# Patient Record
Sex: FEMALE | Race: WHITE | NOT HISPANIC OR LATINO | Employment: FULL TIME | URBAN - METROPOLITAN AREA
[De-identification: names, ages, dates, MRNs, and addresses within clinical notes are randomized per-mention and may not be internally consistent; named-entity substitution may affect disease eponyms.]

---

## 2017-01-07 ENCOUNTER — HOSPITAL ENCOUNTER (OUTPATIENT)
Dept: LAB | Facility: MEDICAL CENTER | Age: 32
End: 2017-01-07
Attending: INTERNAL MEDICINE
Payer: COMMERCIAL

## 2017-01-07 DIAGNOSIS — E03.1 CONGENITAL HYPOTHYROIDISM WITHOUT GOITER: ICD-10-CM

## 2017-01-07 LAB
T4 FREE SERPL-MCNC: 0.5 NG/DL (ref 0.53–1.43)
TSH SERPL DL<=0.005 MIU/L-ACNC: 0.96 UIU/ML (ref 0.3–3.7)

## 2017-01-07 PROCEDURE — 84439 ASSAY OF FREE THYROXINE: CPT

## 2017-01-07 PROCEDURE — 36415 COLL VENOUS BLD VENIPUNCTURE: CPT

## 2017-01-07 PROCEDURE — 84443 ASSAY THYROID STIM HORMONE: CPT

## 2017-01-18 ENCOUNTER — OFFICE VISIT (OUTPATIENT)
Dept: MEDICAL GROUP | Facility: MEDICAL CENTER | Age: 32
End: 2017-01-18
Payer: COMMERCIAL

## 2017-01-18 VITALS
SYSTOLIC BLOOD PRESSURE: 102 MMHG | BODY MASS INDEX: 26.35 KG/M2 | RESPIRATION RATE: 16 BRPM | HEIGHT: 60 IN | DIASTOLIC BLOOD PRESSURE: 62 MMHG | HEART RATE: 102 BPM | WEIGHT: 134.2 LBS | OXYGEN SATURATION: 100 % | TEMPERATURE: 99.2 F

## 2017-01-18 DIAGNOSIS — Z00.00 HEALTH CARE MAINTENANCE: ICD-10-CM

## 2017-01-18 DIAGNOSIS — F41.0 PANIC ATTACKS: ICD-10-CM

## 2017-01-18 DIAGNOSIS — F41.1 GAD (GENERALIZED ANXIETY DISORDER): ICD-10-CM

## 2017-01-18 DIAGNOSIS — F33.1 MAJOR DEPRESSIVE DISORDER, RECURRENT, MODERATE (HCC): ICD-10-CM

## 2017-01-18 DIAGNOSIS — E03.1 CONGENITAL HYPOTHYROIDISM WITHOUT GOITER: ICD-10-CM

## 2017-01-18 PROCEDURE — 99214 OFFICE O/P EST MOD 30 MIN: CPT | Performed by: INTERNAL MEDICINE

## 2017-01-18 RX ORDER — SERTRALINE HYDROCHLORIDE 100 MG/1
50 TABLET, FILM COATED ORAL DAILY
Qty: 90 TAB | Refills: 0 | Status: SHIPPED | OUTPATIENT
Start: 2017-01-18 | End: 2017-02-21 | Stop reason: SDUPTHER

## 2017-01-18 RX ORDER — LEVOTHYROXINE SODIUM 0.12 MG/1
TABLET ORAL
Qty: 180 TAB | Refills: 1 | Status: SHIPPED | OUTPATIENT
Start: 2017-01-18 | End: 2017-07-24 | Stop reason: SDUPTHER

## 2017-01-18 ASSESSMENT — PATIENT HEALTH QUESTIONNAIRE - PHQ9
3. TROUBLE FALLING OR STAYING ASLEEP OR SLEEPING TOO MUCH: 2
6. FEELING BAD ABOUT YOURSELF - OR THAT YOU ARE A FAILURE OR HAVE LET YOURSELF OR YOUR FAMILY DOWN: 2
7. TROUBLE CONCENTRATING ON THINGS, SUCH AS READING THE NEWSPAPER OR WATCHING TELEVISION: 3
8. MOVING OR SPEAKING SO SLOWLY THAT OTHER PEOPLE COULD HAVE NOTICED. OR THE OPPOSITE, BEING SO FIGETY OR RESTLESS THAT YOU HAVE BEEN MOVING AROUND A LOT MORE THAN USUAL: 2
SUM OF ALL RESPONSES TO PHQ9 QUESTIONS 1 AND 2: 2
2. FEELING DOWN, DEPRESSED, IRRITABLE, OR HOPELESS: 1
4. FEELING TIRED OR HAVING LITTLE ENERGY: 3
1. LITTLE INTEREST OR PLEASURE IN DOING THINGS: 1
CLINICAL INTERPRETATION OF PHQ2 SCORE: 2
5. POOR APPETITE OR OVEREATING: 3
9. THOUGHTS THAT YOU WOULD BE BETTER OFF DEAD, OR OF HURTING YOURSELF: 0
SUM OF ALL RESPONSES TO PHQ QUESTIONS 1-9: 17

## 2017-01-18 NOTE — MR AVS SNAPSHOT
Isabel Curry   2017 12:40 PM   Office Visit   MRN: [id number]    Department:  [place] [address]   Dept Phone:  [phone]    Description:  Female : 1985   Provider:  Deny Awad M.D.           Reason for Visit     Medication Refill synthroid     Medication Management new rx follow up       Allergies as of 2017     No Known Allergies      You were diagnosed with     VALENTE (generalized anxiety disorder)   [144920]       Major depressive disorder, recurrent, moderate (CMS-HCC)   [942058]       Panic attacks   [932856]         Vital Signs     Blood Pressure Pulse Temperature Respirations Height Weight    102/62 mmHg 102 37.3 °C (99.2 °F) 16 1.524 m (5') 60.873 kg (134 lb 3.2 oz)    Body Mass Index Oxygen Saturation Last Menstrual Period Smoking Status          26.21 kg/m2 100% 2017 Light Tobacco Smoker        Basic Information     Date Of Birth Sex Race Ethnicity Preferred Language    1985 Female White Non- English      Your appointments     2017 10:00 AM   Initial Behavioral Health Eval with Layla Pedersen   [facility]    [address]  [address]  [facility] [id number] [phone]           30 MIN ARRIVAL PRIOR TO SCHEDULED APPOINTMENT IS REQUIRED. Your appointment will be rescheduled if you arrive later than 30 min before the appointed time.              Problem List              ICD-10-CM Priority Class Noted - Resolved    Congenital hypothyroidism without goiter E03.1   2015 - Present    Health care maintenance Z00.00   2015 - Present    VALENTE (generalized anxiety disorder) F41.1   2016 - Present    Panic attacks F41.0   2016 - Present    Major depressive disorder, recurrent, moderate (CMS-HCC) F33.1   2017 - Present      Health Maintenance        Date Due Completion Dates    IMM DTaP/Tdap/Td Vaccine (1 - Tdap) 2006 ---    PAP SMEAR 2008 ---    IMM INFLUENZA (1) 2016 ---               Current Immunizations     No immunizations on file.      Below and/or attached are the medications your provider expects you to take. Review all of your home medications and newly ordered medications with your provider and/or pharmacist. Follow medication instructions as directed by your provider and/or pharmacist. Please keep your medication list with you and share with your provider. Update the information when medications are discontinued, doses are changed, or new medications (including over-the-counter products) are added; and carry medication information at all times in the event of emergency situations     Allergies:  No Known Allergies          Medications  Valid as of: January 18, 2017 - 12:53 PM    Generic Name Brand Name Tablet Size Instructions for use    ALPRAZolam (Tab) XANAX 0.25 MG Take 1 Tab by mouth at bedtime as needed for Sleep.        Cyanocobalamin   Take  by mouth every day.        Fish Oil   by Does not apply route every day.        Levothyroxine Sodium (Tab) SYNTHROID 125 MCG TAKE 1 TABLET BY MOUTH TWICE A DAY        Sertraline HCl (Tab) ZOLOFT 100 MG Take 0.5 Tabs by mouth every day.        .                 Medicines prescribed today were sent to:     [id number] - [place], [place] - [address] [address]    Phone: [phone] Fax: [phone]    Open 24 Hours?: No    [facility] [address] [facility], [place] - [address] [address]    Phone: [phone] Fax: [phone]    Open 24 Hours?: No      Medication refill instructions:       If your prescription bottle indicates you have medication refills left, it is not necessary to call your provider’s office. Please contact your pharmacy and they will refill your medication.    If your prescription bottle indicates you do not have any refills left, you may request refills at any time through one of the following ways: The online Trusted Insight system (except  Urgent Care), by calling your provider’s office, or by asking your pharmacy to contact your provider’s office with a refill request. Medication refills are processed only during regular business hours and may not be available until the next business day. Your provider may request additional information or to have a follow-up visit with you prior to refilling your medication.   *Please Note: Medication refills are assigned a new Rx number when refilled electronically. Your pharmacy may indicate that no refills were authorized even though a new prescription for the same medication is available at the pharmacy. Please request the medicine by name with the pharmacy before contacting your provider for a refill.           [employer] Access Code: [email or web address]  Expires: 1/27/2017  9:03 AM    [employer]  A secure, online tool to manage your health information     Search to Phone’s [employer]® is a secure, online tool that connects you to your personalized health information from the privacy of your home -- day or night - making it very easy for you to manage your healthcare. Once the activation process is completed, you can even access your medical information using the [employer] amaury, which is available for free in the Apple Amaury store or Google Play store.     [employer] provides the following levels of access (as shown below):   My Chart Features   [facility] Doctor [facility]  Urgent  Care Non-[facility]  Primary Care  Doctor   Email your healthcare team securely and privately 24/7 X X X    Manage appointments: schedule your next appointment; view details of past/upcoming appointments X      Request prescription refills. X      View recent personal medical records, including lab and immunizations X X X X   View health record, including health history, allergies, medications X X X X   Read reports about your outpatient visits, procedures, consult and ER notes X X X X   See your discharge summary, which is a recap  of your hospital and/or ER visit that includes your diagnosis, lab results, and care plan. X X       How to register for Lin[employer]:  1. Go to  [email or web address].  2. Click on the Sign Up Now box, which takes you to the New Member Sign Up page. You will need to provide the following information:  a. Enter your Linq3 Access Code exactly as it appears at the top of this page. (You will not need to use this code after you’ve completed the sign-up process. If you do not sign up before the expiration date, you must request a new code.)   b. Enter your date of birth.   c. Enter your home email address.   d. Click Submit, and follow the next screen’s instructions.  3. Create a Linq3 ID. This will be your Linq3 login ID and cannot be changed, so think of one that is secure and easy to remember.  4. Create a [employer] 2000t password. You can change your password at any time.  5. Enter your Password Reset Question and Answer. This can be used at a later time if you forget your password.   6. Enter your e-mail address. This allows you to receive e-mail notifications when new information is available in Linq3.  7. Click Sign Up. You can now view your health information.    For assistance activating your Linq3 account, call [phone]

## 2017-01-18 NOTE — Clinical Note
[facility]  Deny Awad M.D.  [address]  [facility] [address]  Fax: [phone] Authorization for Release/Disclosure of Protected Health Information   Name: MIKHAIL NICKERSON : 1985 SSN: [id number]   Address: [address] [place] [address] Phone:    [phone] (home)    I authorize the entity listed below to release/disclose the PHI below to [facility]/Deny Awad M.D.   Provider or Entity Name:                                     Address   City, State, [facility]   Phone:      Fax:     Reason for request: continuity of care   Information to be released:    [  ] LAST COLONOSCOPY, including any PATH REPORT [  ] LAST DEXA  [  ] LAST MAMMOGRAM  [XXX  ] LAST PAP [  ] RETINA EXAM REPORT  [  ] IMMUNIZATION RECORDS  [  ] Release all info      [  ] Check here and initial the line next to each item to release ALL health information INCLUDING  _____ Care and treatment for drug and / or alcohol abuse  _____ HIV testing, infection status, or AIDS  _____ Genetic Testing    DATES OF SERVICE OR TIME PERIOD TO BE DISCLOSED: _____________  I understand and acknowledge that:  * This Authorization may be revoked at any time by you in writing, except if your health information has already been used or disclosed.  * Your health information that will be used or disclosed as a result of you signing this authorization could be re-disclosed by the recipient. If this occurs, your re-disclosed health information may no longer be protected by State or Federal laws.  * You may refuse to sign this Authorization. Your refusal will not affect your ability to obtain treatment.  * This Authorization becomes effective upon signing and will  on (date) __________. If no date is indicated, this Authorization will  one (1) year from the signature date.    Name: Mikhail Nickerson    Signature:     Date: 2017

## 2017-01-18 NOTE — PROGRESS NOTES
CC: Isabel Curry is a 31 y.o. female who presents for follow up of the following     Anxiety / Depression, Panic attacaks  Interval course: some improvement  Onset:   Since adolescense  Since then symptoms were up/down, currently severe              Trigger: marriage problems, improved  Mood currently does affect her daily activities.    Previous medications:  none  Current treatment: sertraline, 50 mg QD, counseling   - xanax prn    Risk factors:   · Depression, anxiety, panic attacks  · H/o phobia: no  · H/o panic attacks: no  · H/o hypomanic or manic episode: no  · Substance abuse  (alcohol,  prescription drugs caffeine, tobacco): no  · Family support: yes  · Living alone:  no  · Family history of psych disorders: yes, mother and sister  · Stress: marital issues, improving  · PMH of abuse (sexual, physical, emotional abuse; neglect): no    Depression Screen (PHQ-2/PHQ-9) 12/8/2016 1/18/2017 1/18/2017   PHQ-9 Total Score 20 - 17   PHQ-2 Total Score - 2 -     Congenital hypothyroidism  DG/Onset: congenital  Levothyroxine, 250 mcg, taking daily early in am, before any po intake.  No temperature intolerance. No change in weight,  hair/skin quality, BMs.    No tremors, weakness.  No peripheral swelling.  No mood changes.  Reviewed TSH, as below  FH: multiple    Current Outpatient Prescriptions   Medication Sig Dispense Refill   • sertraline (ZOLOFT) 100 MG Tab Take 0.5 Tabs by mouth every day. 90 Tab 0   • levothyroxine (SYNTHROID) 125 MCG Tab TAKE 1 TABLET BY MOUTH TWICE A DAY 30 Tab 0   • alprazolam (XANAX) 0.25 MG Tab Take 1 Tab by mouth at bedtime as needed for Sleep. 30 Tab 0   • Cyanocobalamin (VITAMIN B-12 PO) Take  by mouth every day.     • FISH OIL by Does not apply route every day.       No current facility-administered medications for this visit.     She  has a past medical history of Hypothyroidism; ADD (attention deficit disorder); VALENTE (generalized anxiety disorder); Depression; and Major  depression.  She  has past surgical history that includes gastric bypass laparoscopic (2012).  Social History   Substance Use Topics   • Smoking status: Light Tobacco Smoker   • Smokeless tobacco: Never Used      Comment: 1 cig per day   • Alcohol Use: No     Social History     Social History Narrative    Lives with  and 2 sons.     Work: stay at home mom     Family History   Problem Relation Age of Onset   • Hypertension Mother    • Hyperlipidemia Mother    • Cancer Mother      breast   • Cancer Maternal Grandmother      breast   • Cancer Maternal Grandfather      leukemia   • Diabetes Father    • Diabetes Paternal Aunt    • Heart Disease Neg Hx    • Stroke Neg Hx    • Thyroid Paternal Grandmother    • Psychiatry Mother      Depression   • Psychiatry Sister      Panic attacks     Family Status   Relation Status Death Age   • Mother Alive    • Father Alive      ROS   Taking supplements to help mood or symptoms: yes  Using alcohol or other substances to help mood or symptoms: no  No polydipsia, polyuria.  No temperature intolerance.  No bowel changes  Denies symptoms of saba: grandiosity, euphoria, need for little or no sleep, rapid pressured speech, spending sprees, reckless or risky behavior, hypersexual behavior.   No visual or auditory hallucinations.  Endo: as above.       Labs     None.     PHYSICAL EXAM   Blood pressure 102/62, pulse 102, temperature 37.3 °C (99.2 °F), resp. rate 16, height 1.524 m (5'), weight 60.873 kg (134 lb 3.2 oz), last menstrual period 01/04/2017, SpO2 100 %.  General: normal speech pattern and content   Clothing and grooming normal.  Behavior: no psychomotor abnormalities or impulsivity  Eye contact: good  Affect: normal  Though content: normal insight and reasoning, no evidence of psychotic process.   Neck/Thyroid: No adenopathy, no palpable thyroid nodules.  Lungs: CTAB  Heart: RRR no ectopy  Neuro: Gait normal. Reflexes normal and symmetric. Sensation grossly intact      Abnormal findings: none    ASSESMENT AND PLAN     1. VALENTE (generalized anxiety disorder)  - sertraline (ZOLOFT) 100 MG Tab; Take 0.5 Tabs by mouth every day.  Dispense: 90 Tab; Refill: 0  2. Major depressive disorder, recurrent, moderate (CMS-HCC)  - sertraline (ZOLOFT) 100 MG Tab; Take 0.5 Tabs by mouth every day.  Dispense: 90 Tab; Refill: 0    Improved, insufficiently, change:   - increase sertraline from 50 mg to 100 mg daily    3. Panic attacks  Improved, treatment as above.    5. Health care maintenance  Pending old records.    - Reviewed effects and side effects of medication, the patient verbalized understanding     25 minutes face to face with 15 spent counseling and coordinating care.     Smoking Counseling: Advised to quit smoking    Follow up: in 6 weeks     Please note that this dictation was created using voice recognition software. Despite all efforts, some minor grammar mistakes are possible.

## 2017-02-21 ENCOUNTER — OFFICE VISIT (OUTPATIENT)
Dept: MEDICAL GROUP | Facility: MEDICAL CENTER | Age: 32
End: 2017-02-21
Payer: COMMERCIAL

## 2017-02-21 VITALS
WEIGHT: 142 LBS | BODY MASS INDEX: 27.88 KG/M2 | HEART RATE: 96 BPM | SYSTOLIC BLOOD PRESSURE: 116 MMHG | RESPIRATION RATE: 14 BRPM | HEIGHT: 60 IN | TEMPERATURE: 97.6 F | OXYGEN SATURATION: 100 % | DIASTOLIC BLOOD PRESSURE: 58 MMHG

## 2017-02-21 DIAGNOSIS — F33.1 MAJOR DEPRESSIVE DISORDER, RECURRENT, MODERATE (HCC): ICD-10-CM

## 2017-02-21 DIAGNOSIS — F41.0 PANIC ATTACKS: ICD-10-CM

## 2017-02-21 DIAGNOSIS — F41.1 GAD (GENERALIZED ANXIETY DISORDER): ICD-10-CM

## 2017-02-21 DIAGNOSIS — Z00.00 HEALTH CARE MAINTENANCE: ICD-10-CM

## 2017-02-21 PROCEDURE — 99214 OFFICE O/P EST MOD 30 MIN: CPT | Performed by: INTERNAL MEDICINE

## 2017-02-21 RX ORDER — SERTRALINE HYDROCHLORIDE 100 MG/1
100 TABLET, FILM COATED ORAL DAILY
Qty: 90 TAB | Refills: 0 | Status: SHIPPED | OUTPATIENT
Start: 2017-02-21 | End: 2018-05-08

## 2017-02-21 RX ORDER — PROPRANOLOL HYDROCHLORIDE 20 MG/1
TABLET ORAL
Qty: 60 TAB | Refills: 1 | Status: SHIPPED | OUTPATIENT
Start: 2017-02-21 | End: 2017-02-22

## 2017-02-21 ASSESSMENT — PATIENT HEALTH QUESTIONNAIRE - PHQ9
5. POOR APPETITE OR OVEREATING: 1
6. FEELING BAD ABOUT YOURSELF - OR THAT YOU ARE A FAILURE OR HAVE LET YOURSELF OR YOUR FAMILY DOWN: 2
4. FEELING TIRED OR HAVING LITTLE ENERGY: 2
1. LITTLE INTEREST OR PLEASURE IN DOING THINGS: 1
9. THOUGHTS THAT YOU WOULD BE BETTER OFF DEAD, OR OF HURTING YOURSELF: 0
7. TROUBLE CONCENTRATING ON THINGS, SUCH AS READING THE NEWSPAPER OR WATCHING TELEVISION: 2
3. TROUBLE FALLING OR STAYING ASLEEP OR SLEEPING TOO MUCH: 3
SUM OF ALL RESPONSES TO PHQ QUESTIONS 1-9: 12
SUM OF ALL RESPONSES TO PHQ9 QUESTIONS 1 AND 2: 2
8. MOVING OR SPEAKING SO SLOWLY THAT OTHER PEOPLE COULD HAVE NOTICED. OR THE OPPOSITE, BEING SO FIGETY OR RESTLESS THAT YOU HAVE BEEN MOVING AROUND A LOT MORE THAN USUAL: 0
2. FEELING DOWN, DEPRESSED, IRRITABLE, OR HOPELESS: 1

## 2017-02-21 NOTE — PROGRESS NOTES
CHIEF COMPLAINT  Chief Complaint   Patient presents with   • Follow-Up   Anxiety    HPI  Patient is a 31 y.o. female patient who presents today for the following       Anxiety / Depression, Panic attacaks  Interval course: some improvement  Onset:   Since adolescense  Since then symptoms were up/down, currently severe              Trigger: marriage problems, improved  Mood currently does affect her daily activities.    Previous medications:  none  Current treatment: sertraline, 50 mg QD, counseling              - xanax prn    Risk factors:   · Depression, anxiety, panic attacks  · H/o phobia: no  · H/o panic attacks: no  · H/o hypomanic or manic episode: no  · Substance abuse  (alcohol,  prescription drugs caffeine, tobacco): no  · Family support: yes  · Living alone:  no  · Family history of psych disorders: yes, mother and sister  · Stress: marital issues, improving  · PMH of abuse (sexual, physical, emotional abuse; neglect): no    VALENTE-7 score:  2/17   1. Feeling nervous, anxious, or on edge  2   2. Not being able to stop or control worrying 2   3. Worrying too much about different things 2   4. Trouble relaxing 2   5. Being so restless that it is hard to sit still 1   6. Becoming easily annoyed or irritable 3   7. Feeling afraid as if something awful might happen 1   Total score 19     Depression Screen (PHQ-2/PHQ-9) 1/18/2017 1/18/2017 2/21/2017   PHQ-9 Total Score - 17 12   PHQ-2 Total Score - - -   PHQ-2 Total Score 2 - -   PHQ-9 Total Score - - -     Reviewed PMH, PSH, FH, SH, ALL, HCM/IMM, no changes  Reviewed MEDS, no changes    Patient Active Problem List    Diagnosis Date Noted   • Major depressive disorder, recurrent, moderate (CMS-HCC) 01/18/2017   • VALENTE (generalized anxiety disorder) 12/08/2016   • Panic attacks 12/08/2016   • Congenital hypothyroidism without goiter 11/04/2015   • Health care maintenance 11/04/2015     CURRENT MEDICATIONS  Current Outpatient Prescriptions   Medication Sig Dispense  Refill   • sertraline (ZOLOFT) 100 MG Tab Take 0.5 Tabs by mouth every day. 90 Tab 0   • levothyroxine (SYNTHROID) 125 MCG Tab TAKE 1 TABLET BY MOUTH TWICE A  Tab 1   • alprazolam (XANAX) 0.25 MG Tab Take 1 Tab by mouth at bedtime as needed for Sleep. 30 Tab 0   • Cyanocobalamin (VITAMIN B-12 PO) Take  by mouth every day.     • FISH OIL by Does not apply route every day.       No current facility-administered medications for this visit.     ALLERGIES  Allergies: Review of patient's allergies indicates no known allergies.  PAST MEDICAL HISTORY  Past Medical History   Diagnosis Date   • Hypothyroidism      congenital   • ADD (attention deficit disorder)    • VALENTE (generalized anxiety disorder)    • Depression    • Major depression      SURGICAL HISTORY  She  has past surgical history that includes gastric bypass laparoscopic (2012).  SOCIAL HISTORY  Social History   Substance Use Topics   • Smoking status: Light Tobacco Smoker   • Smokeless tobacco: Never Used      Comment: 1 cig per day   • Alcohol Use: No     Social History     Social History Narrative    Lives with  and 2 sons.     Work: stay at home mom     FAMILY HISTORY  Family History   Problem Relation Age of Onset   • Hypertension Mother    • Hyperlipidemia Mother    • Cancer Mother      breast   • Cancer Maternal Grandmother      breast   • Cancer Maternal Grandfather      leukemia   • Diabetes Father    • Diabetes Paternal Aunt    • Heart Disease Neg Hx    • Stroke Neg Hx    • Thyroid Paternal Grandmother    • Psychiatry Mother      Depression   • Psychiatry Sister      Panic attacks     Family Status   Relation Status Death Age   • Mother Alive    • Father Alive        ROS   Constitutional: Negative for fever, chills.  HENT: Negative for congestion, sore throat.  Eyes: Negative for blurred vision.   Respiratory: Negative for cough, shortness of breath.  Cardiovascular: Negative for chest pain, palpitations.   Gastrointestinal: Negative for  heartburn, nausea, abdominal pain.   Genitourinary: Negative for dysuria.  Musculoskeletal: Negative for significant myalgias, back pain and joint pain.   Skin: Negative for rash and itching.   Neuro: Negative for dizziness, weakness and headaches.   Endo/Heme/Allergies: Does not bruise/bleed easily; controlled hypothyroid. .   Psychiatric/Behavioral: as above.    PHYSICAL EXAM   /58 mmHg  Pulse 96  Temp(Src) 36.4 °C (97.6 °F)  Resp 14  Ht 1.524 m (5')  Wt 64.411 kg (142 lb)  BMI 27.73 kg/m2  SpO2 100%  General:  NAD, well appearing  HEENT:   NC/AT, PERRLA, EOMI, TMs are clear. Oropharyngeal mucosa is pink,  without lesions;  no cervical / supraclavicular  lymphadenopathy, no thyromegaly.    Cardiovascular: RRR.   No m/r/g. No carotid bruits .      Lungs:   CTAB, no w/r/r, no respiratory distress.  Abdomen: Soft, NT/ND + BS; no suprapubic tenderness; no masses or hepatosplenomegaly.  Extremities:  2+ DP and radial pulses bilaterally.  No c/c/e.   Skin:  Warm, dry.  No erythema. No rash.   Neurologic: Alert & oriented x 3.  No focal deficits.  Psychiatric:  Affect normal, mood normal, judgment normal.    LABS     None    IMAGING     None    ASSESMENT AND PLAN        1. VALENTE (generalized anxiety disorder)  Change:  - increase sertraline from 50 mg to 100 mg daily  - sertraline (ZOLOFT) 100 MG Tab; Take 1 Tab by mouth every day.  Dispense: 90 Tab; Refill: 0    2. Major depressive disorder, recurrent, moderate (CMS-HCC)  As above:  - sertraline (ZOLOFT) 100 MG Tab; Take 1 Tab by mouth every day.  Dispense: 90 Tab; Refill: 0    3. Panic attacks  Add propranolol, as needed.   May take 0.5 mg of xanax if needed for panic attacks  - sertraline (ZOLOFT) 100 MG Tab; Take 1 Tab by mouth every day.  Dispense: 90 Tab; Refill: 0  - propranolol (INDERAL) 20 MG Tab; Take 1 tab by mouth with panic attacks  Dispense: 60 Tab; Refill: 1    4. Health care maintenance  Advised to schedule PAP smear.   Declined flu  vaccine.    Counseling:   - Smoking:  Nonsmoker    Followup: Return in about 3 months (around 5/21/2017).    All questions are answered.    Please note that this dictation was created using voice recognition software, and that there might be errors of merry and possibly content.

## 2017-02-21 NOTE — MR AVS SNAPSHOT
Isabel Curry   2017 7:00 AM   Office Visit   MRN: [id number]    Department:  [place] [address]   Dept Phone:  [phone]    Description:  Female : 1985   Provider:  Deny Awad M.D.           Reason for Visit     Follow-Up           Allergies as of 2017     No Known Allergies      You were diagnosed with     VALENTE (generalized anxiety disorder)   [038747]       Major depressive disorder, recurrent, moderate (CMS-HCC)   [383438]       Panic attacks   [856593]       Health care maintenance   [935587]         Vital Signs     Blood Pressure Pulse Temperature Respirations Height Weight    116/58 mmHg 96 36.4 °C (97.6 °F) 14 1.524 m (5') 64.411 kg (142 lb)    Body Mass Index Oxygen Saturation Smoking Status             27.73 kg/m2 100% Light Tobacco Smoker         Basic Information     Date Of Birth Sex Race Ethnicity Preferred Language    1985 Female White Non- English      Your appointments     2017  7:00 AM   Established Patient with Deny Awad M.D.   [facility]    [address]  [address]  [place] [id number] [phone]           You will be receiving a confirmation call a few days before your appointment from our automated call confirmation system.              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      Below  and/or attached are the medications your provider expects you to take. Review all of your home medications and newly ordered medications with your provider and/or pharmacist. Follow medication instructions as directed by your provider and/or pharmacist. Please keep your medication list with you and share with your provider. Update the information when medications are discontinued, doses are changed, or new medications (including over-the-counter products) are added; and carry medication information at all times in the event of emergency situations     Allergies:  No Known Allergies          Medications  Valid as of: February 21, 2017 -  7:27 AM    Generic Name Brand Name Tablet Size Instructions for use    ALPRAZolam (Tab) XANAX 0.25 MG Take 1 Tab by mouth at bedtime as needed for Sleep.        Biotin   Take  by mouth.        Cyanocobalamin   Take  by mouth every day.        Fish Oil   by Does not apply route every day.        Levothyroxine Sodium (Tab) SYNTHROID 125 MCG TAKE 1 TABLET BY MOUTH TWICE A DAY        Propranolol HCl (Tab) INDERAL 20 MG Take 1 tab by mouth with panic attacks        Sertraline HCl (Tab) ZOLOFT 100 MG Take 1 Tab by mouth every day.        .                 Medicines prescribed today were sent to:     [facility] [id number] [facility], [place] - [address] [address]    Phone: [phone] Fax: [phone]    Open 24 Hours?: No    [facility] [address] [facility], [place] - [address] [id number] [facility] [address]    Phone: [phone] Fax: [phone]    Open 24 Hours?: No      Medication refill instructions:       If your prescription bottle indicates you have medication refills left, it is not necessary to call your provider’s office. Please contact your pharmacy and they will refill your medication.    If your prescription bottle indicates you do not have any refills left, you may request refills at any time through one of the  following ways: The online Keelvar system (except Urgent Care), by calling your provider’s office, or by asking your pharmacy to contact your provider’s office with a refill request. Medication refills are processed only during regular business hours and may not be available until the next business day. Your provider may request additional information or to have a follow-up visit with you prior to refilling your medication.   *Please Note: Medication refills are assigned a new Rx number when refilled electronically. Your pharmacy may indicate that no refills were authorized even though a new prescription for the same medication is available at the pharmacy. Please request the medicine by name with the pharmacy before contacting your provider for a refill.           Keelvar Access Code: [email or web address]  Expires: 2/25/2017 11:41 AM    Keelvar  A secure, online tool to manage your health information     [employer] Keelvar® is a secure, online tool that connects you to your personalized health information from the privacy of your home -- day or night - making it very easy for you to manage your healthcare. Once the activation process is completed, you can even access your medical information using the Keelvar amaury, which is available for free in the Apple Amaury store or Google Play store.     Keelvar provides the following levels of access (as shown below):   My Chart Features   [facility] Doctor [facility]  Urgent  Care Non-[facility]  Primary Care  Doctor   Email your healthcare team securely and privately 24/7 X X X    Manage appointments: schedule your next appointment; view details of past/upcoming appointments X      Request prescription refills. X      View recent personal medical records, including lab and immunizations X X X X   View health record, including health history, allergies, medications X X X X   Read reports about your outpatient visits, procedures, consult and ER notes X X X  X   See your discharge summary, which is a recap of your hospital and/or ER visit that includes your diagnosis, lab results, and care plan. X X       How to register for Striiv:  1. Go to  [email or web address].  2. Click on the Sign Up Now box, which takes you to the New Member Sign Up page. You will need to provide the following information:  a. Enter your Striiv Access Code exactly as it appears at the top of this page. (You will not need to use this code after you’ve completed the sign-up process. If you do not sign up before the expiration date, you must request a new code.)   b. Enter your date of birth.   c. Enter your home email address.   d. Click Submit, and follow the next screen’s instructions.  3. Create a iOculit ID. This will be your iOculit login ID and cannot be changed, so think of one that is secure and easy to remember.  4. Create a iOculit password. You can change your password at any time.  5. Enter your Password Reset Question and Answer. This can be used at a later time if you forget your password.   6. Enter your e-mail address. This allows you to receive e-mail notifications when new information is available in Striiv.  7. Click Sign Up. You can now view your health information.    For assistance activating your Striiv account, call [phone]

## 2017-02-22 DIAGNOSIS — Z01.812 PRE-OPERATIVE LABORATORY EXAMINATION: ICD-10-CM

## 2017-02-22 LAB
ERYTHROCYTE [DISTWIDTH] IN BLOOD BY AUTOMATED COUNT: 49.2 FL (ref 35.9–50)
HCG SERPL QL: NEGATIVE
HCT VFR BLD AUTO: 28.2 % (ref 37–47)
HGB BLD-MCNC: 8.2 G/DL (ref 12–16)
MCH RBC QN AUTO: 20.1 PG (ref 27–33)
MCHC RBC AUTO-ENTMCNC: 29.1 G/DL (ref 33.6–35)
MCV RBC AUTO: 69.3 FL (ref 81.4–97.8)
PLATELET # BLD AUTO: 476 K/UL (ref 164–446)
PMV BLD AUTO: 10.8 FL (ref 9–12.9)
RBC # BLD AUTO: 4.07 M/UL (ref 4.2–5.4)
WBC # BLD AUTO: 6.6 K/UL (ref 4.8–10.8)

## 2017-02-22 PROCEDURE — 84703 CHORIONIC GONADOTROPIN ASSAY: CPT

## 2017-02-22 PROCEDURE — 85027 COMPLETE CBC AUTOMATED: CPT

## 2017-02-22 PROCEDURE — 36415 COLL VENOUS BLD VENIPUNCTURE: CPT

## 2017-02-22 NOTE — OR NURSING
PreAdmit Appointment: Patient instructed to continue regularly prescribed medications through day before surgery. Instructed to take the following medications the day of surgery with a sip of water per Anesthesia protocol: Synthroid, Zoloft, Xanax if needed.

## 2017-02-24 ENCOUNTER — HOSPITAL ENCOUNTER (OUTPATIENT)
Facility: MEDICAL CENTER | Age: 32
End: 2017-02-24
Attending: SPECIALIST | Admitting: SPECIALIST
Payer: COMMERCIAL

## 2017-02-24 VITALS
WEIGHT: 132.06 LBS | DIASTOLIC BLOOD PRESSURE: 76 MMHG | TEMPERATURE: 97.5 F | OXYGEN SATURATION: 99 % | RESPIRATION RATE: 16 BRPM | SYSTOLIC BLOOD PRESSURE: 121 MMHG | HEART RATE: 88 BPM | HEIGHT: 60 IN | BODY MASS INDEX: 25.93 KG/M2

## 2017-02-24 PROBLEM — Z98.890 STATUS POST RHINOPLASTY: Status: ACTIVE | Noted: 2017-02-24

## 2017-02-24 PROBLEM — Z41.1 ENCOUNTER FOR COSMETIC SURGERY: Status: ACTIVE | Noted: 2017-02-24

## 2017-02-24 PROBLEM — Z98.890 STATUS POST RHINOPLASTY: Status: RESOLVED | Noted: 2017-02-24 | Resolved: 2017-02-24

## 2017-02-24 PROCEDURE — 700101 HCHG RX REV CODE 250

## 2017-02-24 PROCEDURE — 500054 HCHG BANDAGE, ELASTIC 6: Performed by: SPECIALIST

## 2017-02-24 PROCEDURE — 502575 HCHG PACK, BREAST: Performed by: SPECIALIST

## 2017-02-24 PROCEDURE — 160048 HCHG OR STATISTICAL LEVEL 1-5: Performed by: SPECIALIST

## 2017-02-24 PROCEDURE — 700102 HCHG RX REV CODE 250 W/ 637 OVERRIDE(OP)

## 2017-02-24 PROCEDURE — 160025 RECOVERY II MINUTES (STATS): Performed by: SPECIALIST

## 2017-02-24 PROCEDURE — 160002 HCHG RECOVERY MINUTES (STAT): Performed by: SPECIALIST

## 2017-02-24 PROCEDURE — 160009 HCHG ANES TIME/MIN: Performed by: SPECIALIST

## 2017-02-24 PROCEDURE — 160039 HCHG SURGERY MINUTES - EA ADDL 1 MIN LEVEL 3: Performed by: SPECIALIST

## 2017-02-24 PROCEDURE — 500817 HCHG MAMMARY SIZER: Performed by: SPECIALIST

## 2017-02-24 PROCEDURE — 700111 HCHG RX REV CODE 636 W/ 250 OVERRIDE (IP)

## 2017-02-24 PROCEDURE — 110382 HCHG SHELL REV 271: Performed by: SPECIALIST

## 2017-02-24 PROCEDURE — 500122 HCHG BOVIE, BLADE: Performed by: SPECIALIST

## 2017-02-24 PROCEDURE — A4606 OXYGEN PROBE USED W OXIMETER: HCPCS | Performed by: SPECIALIST

## 2017-02-24 PROCEDURE — 110371 HCHG SHELL REV 272: Performed by: SPECIALIST

## 2017-02-24 PROCEDURE — 160046 HCHG PACU - 1ST 60 MINS PHASE II: Performed by: SPECIALIST

## 2017-02-24 PROCEDURE — 160028 HCHG SURGERY MINUTES - 1ST 30 MINS LEVEL 3: Performed by: SPECIALIST

## 2017-02-24 PROCEDURE — 501838 HCHG SUTURE GENERAL: Performed by: SPECIALIST

## 2017-02-24 PROCEDURE — 160035 HCHG PACU - 1ST 60 MINS PHASE I: Performed by: SPECIALIST

## 2017-02-24 PROCEDURE — A9270 NON-COVERED ITEM OR SERVICE: HCPCS

## 2017-02-24 DEVICE — IMPLANTABLE DEVICE: Type: IMPLANTABLE DEVICE | Status: FUNCTIONAL

## 2017-02-24 RX ORDER — GENTAMICIN SULFATE 40 MG/ML
INJECTION, SOLUTION INTRAMUSCULAR; INTRAVENOUS
Status: DISCONTINUED | OUTPATIENT
Start: 2017-02-24 | End: 2017-02-24 | Stop reason: HOSPADM

## 2017-02-24 RX ORDER — BACITRACIN 50000 [IU]/1
INJECTION, POWDER, FOR SOLUTION INTRAMUSCULAR
Status: DISCONTINUED | OUTPATIENT
Start: 2017-02-24 | End: 2017-02-24 | Stop reason: HOSPADM

## 2017-02-24 RX ORDER — MIDAZOLAM HYDROCHLORIDE 1 MG/ML
INJECTION INTRAMUSCULAR; INTRAVENOUS
Status: DISCONTINUED
Start: 2017-02-24 | End: 2017-02-24 | Stop reason: HOSPADM

## 2017-02-24 RX ORDER — CEFAZOLIN SODIUM 1 G/3ML
INJECTION, POWDER, FOR SOLUTION INTRAMUSCULAR; INTRAVENOUS
Status: DISCONTINUED | OUTPATIENT
Start: 2017-02-24 | End: 2017-02-24 | Stop reason: HOSPADM

## 2017-02-24 RX ORDER — OXYCODONE HCL 5 MG/5 ML
SOLUTION, ORAL ORAL
Status: COMPLETED
Start: 2017-02-24 | End: 2017-02-24

## 2017-02-24 RX ORDER — BUPIVACAINE HYDROCHLORIDE AND EPINEPHRINE 2.5; 5 MG/ML; UG/ML
INJECTION, SOLUTION EPIDURAL; INFILTRATION; INTRACAUDAL; PERINEURAL
Status: DISCONTINUED | OUTPATIENT
Start: 2017-02-24 | End: 2017-02-24 | Stop reason: HOSPADM

## 2017-02-24 RX ORDER — SODIUM CHLORIDE, SODIUM LACTATE, POTASSIUM CHLORIDE, CALCIUM CHLORIDE 600; 310; 30; 20 MG/100ML; MG/100ML; MG/100ML; MG/100ML
1000 INJECTION, SOLUTION INTRAVENOUS
Status: COMPLETED | OUTPATIENT
Start: 2017-02-24 | End: 2017-02-24

## 2017-02-24 RX ORDER — LIDOCAINE HYDROCHLORIDE 10 MG/ML
INJECTION, SOLUTION INFILTRATION; PERINEURAL
Status: DISCONTINUED
Start: 2017-02-24 | End: 2017-02-24 | Stop reason: HOSPADM

## 2017-02-24 RX ADMIN — SODIUM CHLORIDE, SODIUM LACTATE, POTASSIUM CHLORIDE, CALCIUM CHLORIDE 1000 ML: 600; 310; 30; 20 INJECTION, SOLUTION INTRAVENOUS at 08:04

## 2017-02-24 RX ADMIN — FENTANYL CITRATE 50 MCG: 50 INJECTION, SOLUTION INTRAMUSCULAR; INTRAVENOUS at 11:19

## 2017-02-24 RX ADMIN — OXYCODONE HYDROCHLORIDE 10 MG: 5 SOLUTION ORAL at 11:20

## 2017-02-24 RX ADMIN — FENTANYL CITRATE 50 MCG: 50 INJECTION, SOLUTION INTRAMUSCULAR; INTRAVENOUS at 11:25

## 2017-02-24 RX ADMIN — FENTANYL CITRATE 50 MCG: 50 INJECTION, SOLUTION INTRAMUSCULAR; INTRAVENOUS at 11:35

## 2017-02-24 ASSESSMENT — PAIN SCALES - GENERAL
PAINLEVEL_OUTOF10: 8
PAINLEVEL_OUTOF10: 8

## 2017-02-24 NOTE — IP AVS SNAPSHOT
Home Care Instructions                                                                                                                Name:Isabel Curry  Medical Record Number:[id number]  CSN: [id number]    YOB: 1985   Age: 31 y.o.  Sex: female  HT:1.524 m (5') WT: 59.9 kg (132 lb 0.9 oz)          Admit Date: 2/24/2017     Discharge Date:   Today's Date: 2/24/2017  Attending Doctor:  Isaias Mix M.D.                  Allergies:  Review of patient's allergies indicates no known allergies.                Discharge Instructions         ACTIVITY: Rest and take it easy for the first 24 hours.  A responsible adult is recommended to remain with you during that time.  It is normal to feel sleepy.  We encourage you to not do anything that requires balance, judgment or coordination.    MILD FLU-LIKE SYMPTOMS ARE NORMAL. YOU MAY EXPERIENCE GENERALIZED MUSCLE ACHES, THROAT IRRITATION, HEADACHE AND/OR SOME NAUSEA.    FOR 24 HOURS DO NOT:  Drive, operate machinery or run household appliances.  Drink beer or alcoholic beverages.   Make important decisions or sign legal documents.    SPECIAL INSTRUCTIONS: Sleep/rest with head elevated at least 30 degrees (ie well propped up with pillows in bed or in recliner chair)    DIET: To avoid nausea, slowly advance diet as tolerated, avoiding spicy or greasy foods for the first day.  Add more substantial food to your diet according to your physician's instructions.  INCREASE FLUIDS AND FIBER TO AVOID CONSTIPATION.    SURGICAL DRESSING/BATHING: Keep dressings clean dry and intact, may shower in 48 hours    FOLLOW-UP APPOINTMENT:  A follow-up appointment should be arranged with your doctor; call to schedule.    You should CALL YOUR PHYSICIAN if you develop:  Fever greater than 101 degrees F.  Pain not relieved by medication, or persistent nausea or vomiting.  Excessive bleeding (blood soaking through dressing) or unexpected drainage from the wound.  Extreme redness or  swelling around the incision site, drainage of pus or foul smelling drainage.  Inability to urinate or empty your bladder within 8 hours.  Problems with breathing or chest pain.    You should call 911 if you develop problems with breathing or chest pain.  If you are unable to contact your doctor or surgical center, you should go to the nearest emergency room or urgent care center.  Physician's telephone #: [phone]    If any questions arise, call your doctor.  If your doctor is not available, please feel free to call the Surgical Center at [phone].  The Center is open Monday through Friday from 7AM to 7PM.  You can also call the HEALTH HOTLINE open 24 hours/day, 7 days/week and speak to a nurse at [phone], or toll free at [phone].    A registered nurse may call you a few days after your surgery to see how you are doing after your procedure.    MEDICATIONS: Resume taking daily medication.  Take prescribed pain medication with food.  If no medication is prescribed, you may take non-aspirin pain medication if needed.  PAIN MEDICATION CAN BE VERY CONSTIPATING.  Take a stool softener or laxative such as senokot, pericolace, or milk of magnesia if needed.    Prescription given pre-operatively.  Last pain medication given at 11:20 a.m.   Start your antibiotics tomorrow..    If your physician has prescribed pain medication that includes Acetaminophen (Tylenol), do not take additional Acetaminophen (Tylenol) while taking the prescribed medication.    Depression / Suicide Risk    As you are discharged from this [facility] facility, it is important to learn how to keep safe from harming yourself.    Recognize the warning signs:  · Abrupt changes in personality, positive or negative- including increase in energy   · Giving away possessions  · Change in eating patterns- significant weight changes-  positive or negative  · Change in sleeping patterns- unable to sleep or sleeping all the  time   · Unwillingness or inability to communicate  · Depression  · Unusual sadness, discouragement and loneliness  · Talk of wanting to die  · Neglect of personal appearance   · Rebelliousness- reckless behavior  · Withdrawal from people/activities they love  · Confusion- inability to concentrate     If you or a loved one observes any of these behaviors or has concerns about self-harm, here's what you can do:  · Talk about it- your feelings and reasons for harming yourself  · Remove any means that you might use to hurt yourself (examples: pills, rope, extension cords, firearm)  · Get professional help from the community (Mental Health, Substance Abuse, psychological counseling)  · Do not be alone:Call your Safe Contact- someone whom you trust who will be there for you.  · Call your local CRISIS HOTLINE 089-4036 or 021-522-7822  · Call your local Children's Mobile Crisis Response Team [place] (870) 749-7572 or www.[employer]  · Call the toll free National Suicide Prevention Hotlines   · National Suicide Prevention Lifeline 938-792-AULD (6379)  · [employer] Hope Line Network 800-SUICIDE (055-4391)       Medication List      CONTINUE taking these medications        Instructions    alprazolam 0.25 MG Tabs   Commonly known as:  XANAX    Take 1 Tab by mouth at bedtime as needed for Sleep.   Dose:  0.25 mg       levothyroxine 125 MCG Tabs   Commonly known as:  SYNTHROID    TAKE 1 TABLET BY MOUTH TWICE A DAY       sertraline 100 MG Tabs   Commonly known as:  ZOLOFT    Take 1 Tab by mouth every day.   Dose:  100 mg               Medication Information     Above and/or attached are the medications your physician expects you to take upon discharge. Review all of your home medications and newly ordered medications with your doctor and/or pharmacist. Follow medication instructions as directed by your doctor and/or pharmacist. Please keep your medication list with you and share with your physician. Update the  information when medications are discontinued, doses are changed, or new medications (including over-the-counter products) are added; and carry medication information at all times in the event of emergency situations.        Resources     Quit Smoking / Tobacco Use:    I understand the use of any tobacco products increases my chance of suffering from future heart disease or stroke and could cause other illnesses which may shorten my life. Quitting the use of tobacco products is the single most important thing I can do to improve my health. For further information on smoking / tobacco cessation call a Toll Free Quit Line at 1-534.946.5561 (*[employer]) or 1-504.604.2877 (American Lung Association) or you can access the web based program at www.lungusa.org.    [place] Tobacco Users Help Line:  (865) 687-8382       Toll Free: 1-121.622.7646  Quit Tobacco Program [facility] (715)136-2085    Crisis Hotline:    [place] Crisis Hotline:  1-207-NXGDWSO or 1-351.268.7195    [place] Crisis Hotline:    1-602.549.4120 or 643-722-8113    Discharge Survey:   Thank you for choosing [facility]. We hope we did everything we could to make your hospital stay a pleasant one. You may be receiving a survey and we would appreciate your time and participation in answering the questions. Your input is very valuable to us in our efforts to improve our service to our patients and their families.            Signatures     My signature on this form indicates that:    1. I acknowledge receipt and understanding of these Home Care Instruction.  2. My questions regarding this information have been answered to my satisfaction.  3. I have formulated a plan with my discharge nurse to obtain my prescribed medications for home.    __________________________________      __________________________________                   Patient Signature                                 Guardian/Responsible Adult  Signature      __________________________________                 __________       ________                       Nurse Signature                                               Date                 Time

## 2017-02-24 NOTE — OR NURSING
1153: Report from JAZMIN Hobbs.  1158: Patient arrives to stage 2, up to recliner and dressed with assist by CNA. Dressing to bilateral breast CDI.   1205: Pain tolerable, denies nausea. Awaiting ride.  1222: Discharge instructions given to patient/family at [facility], all questions answered and patient/family stated understanding. Wristband removed. Patient discharged from phase 2 via wheelchair and RN to personal vehicle without incident.

## 2017-02-24 NOTE — OR NURSING
1109: To PACU from OR via gurney, sleeping, respirations spontaneous and non-labored via OPA. Bilateral breast surgical dressings and post-op bra c/d/i.  1115: Rouses spontaneously, denies pain and nausea.  1120: Medicated for onset severe surgical pain.  1135: Pt texting/calling friends/family, remedicated for persistent severe surgical pain.   1150: pt states pain now tolerable. No change in surgical site assessment. Meets criteria to transfer to Stage 2

## 2017-02-24 NOTE — DISCHARGE INSTRUCTIONS
ACTIVITY: Rest and take it easy for the first 24 hours.  A responsible adult is recommended to remain with you during that time.  It is normal to feel sleepy.  We encourage you to not do anything that requires balance, judgment or coordination.    MILD FLU-LIKE SYMPTOMS ARE NORMAL. YOU MAY EXPERIENCE GENERALIZED MUSCLE ACHES, THROAT IRRITATION, HEADACHE AND/OR SOME NAUSEA.    FOR 24 HOURS DO NOT:  Drive, operate machinery or run household appliances.  Drink beer or alcoholic beverages.   Make important decisions or sign legal documents.    SPECIAL INSTRUCTIONS: Sleep/rest with head elevated at least 30 degrees (ie well propped up with pillows in bed or in recliner chair)    DIET: To avoid nausea, slowly advance diet as tolerated, avoiding spicy or greasy foods for the first day.  Add more substantial food to your diet according to your physician's instructions.  INCREASE FLUIDS AND FIBER TO AVOID CONSTIPATION.    SURGICAL DRESSING/BATHING: Keep dressings clean dry and intact, may shower in 48 hours    FOLLOW-UP APPOINTMENT:  A follow-up appointment should be arranged with your doctor; call to schedule.    You should CALL YOUR PHYSICIAN if you develop:  Fever greater than 101 degrees F.  Pain not relieved by medication, or persistent nausea or vomiting.  Excessive bleeding (blood soaking through dressing) or unexpected drainage from the wound.  Extreme redness or swelling around the incision site, drainage of pus or foul smelling drainage.  Inability to urinate or empty your bladder within 8 hours.  Problems with breathing or chest pain.    You should call 911 if you develop problems with breathing or chest pain.  If you are unable to contact your doctor or surgical center, you should go to the nearest emergency room or urgent care center.  Physician's telephone #: [phone]    If any questions arise, call your doctor.  If your doctor is not available, please feel free to call the Surgical Center at [phone].   The Center is open Monday through Friday from 7AM to 7PM.  You can also call the HEALTH HOTLINE open 24 hours/day, 7 days/week and speak to a nurse at [phone], or toll free at [phone].    A registered nurse may call you a few days after your surgery to see how you are doing after your procedure.    MEDICATIONS: Resume taking daily medication.  Take prescribed pain medication with food.  If no medication is prescribed, you may take non-aspirin pain medication if needed.  PAIN MEDICATION CAN BE VERY CONSTIPATING.  Take a stool softener or laxative such as senokot, pericolace, or milk of magnesia if needed.    Prescription given pre-operatively.  Last pain medication given at 11:20 a.m.   Start your antibiotics tomorrow..    If your physician has prescribed pain medication that includes Acetaminophen (Tylenol), do not take additional Acetaminophen (Tylenol) while taking the prescribed medication.    Depression / Suicide Risk    As you are discharged from this [facility] facility, it is important to learn how to keep safe from harming yourself.    Recognize the warning signs:  · Abrupt changes in personality, positive or negative- including increase in energy   · Giving away possessions  · Change in eating patterns- significant weight changes-  positive or negative  · Change in sleeping patterns- unable to sleep or sleeping all the time   · Unwillingness or inability to communicate  · Depression  · Unusual sadness, discouragement and loneliness  · Talk of wanting to die  · Neglect of personal appearance   · Rebelliousness- reckless behavior  · Withdrawal from people/activities they love  · Confusion- inability to concentrate     If you or a loved one observes any of these behaviors or has concerns about self-harm, here's what you can do:  · Talk about it- your feelings and reasons for harming yourself  · Remove any means that you might use to hurt yourself (examples: pills, rope, extension cords,  firearm)  · Get professional help from the community (Mental Health, Substance Abuse, psychological counseling)  · Do not be alone:Call your Safe Contact- someone whom you trust who will be there for you.  · Call your local CRISIS HOTLINE 125-0435 or 466-470-8775  · Call your local Children's Mobile Crisis Response Team [place] (331) 868-1627 or www.[employer]  · Call the toll free National Suicide Prevention Hotlines   · National Suicide Prevention Lifeline 463-254-PYSU (7380)  · National Hope Line Network 800-SUICIDE (675-8080)

## 2017-02-24 NOTE — OR SURGEON
Immediate Post-Operative Note      PreOp Diagnosis: hypomastia    PostOp Diagnosis: same    Procedure(s):  MAMMOPLASTY AUGMENTATION - Wound Class: Clean    Surgeon(s):  Isaias Mix M.D.    Anesthesiologist/Type of Anesthesia:  Anesthesiologist: Pablito Chavarria M.D./General    Surgical Staff:  Circulator: Brenna Purvis R.N.  Scrub Person: Kaylin Justin    Specimen:     Estimated Blood Loss: 75cc    Findings:     Complications: none        2/24/2017 11:24 AM Isaias Mix

## 2017-02-24 NOTE — IP AVS SNAPSHOT
2/24/2017          Isabel Curry  [address] [facility] [address]    Dear Isabel:    [facility] wants to ensure your discharge home is safe and you or your loved ones have had all your questions answered regarding your care after you leave the hospital.    You may receive a telephone call within two days of your discharge.  This call is to make certain you understand your discharge instructions as well as ensure we provided you with the best care possible during your stay with us.     The call will only last approximately 3-5 minutes and will be done by a nurse.    Once again, we want to ensure your discharge home is safe and that you have a clear understanding of any next steps in your care.  If you have any questions or concerns, please do not hesitate to contact us, we are here for you.  Thank you for choosing [facility] for your healthcare needs.    Sincerely,    Iftikhar Bey    [facility]

## 2017-02-24 NOTE — OR NURSING
0740 Patient to preop. Gowned, consent form confirmed. Vital signs taken. IV started. Patient awaits MD.

## 2017-02-24 NOTE — OP REPORT
DATE OF SERVICE:  02/24/2017    PREOPERATIVE DIAGNOSIS:  Bilateral breast hypomastia.    POSTOPERATIVE DIAGNOSIS:  Bilateral breast hypomastia    OPERATIVE PROCEDURE:  Bilateral breast augmentation (Allergan gel implants,   style  mL bilateral).    SURGEON:  Isaias Mix MD    ANESTHESIOLOGIST:  Pablito Chavarria MD    ANESTHESIA:  General endotracheal tube.    COMPLICATIONS:  None.    ESTIMATED BLOOD LOSS:  Less than 75 mL    INDICATIONS:  The patient is a 31-year-old female who desires bilateral breast   augmentation.  Risks and benefits of the procedure have been explained in   full including infection, bleeding, encapsulation, deflation, asymmetries,   nerve damage, rippling on sides, possible decreased cancer detection, and   possible inability to breastfeed.  Patient has had significant weight loss and   she has lost the volume of her breasts.  There is some minor asymmetries   between the 2 sides.  The patient is aware of this prior surgery.    FINDINGS:  As above.    PROCEDURE:  The patient was preoperatively marked in the holding room in   standing position.  She was taken to operating theater where general   anesthesia was induced, 1 gram Ancef was given prior to starting the   procedure.  Starting on the right side, I injected 10 mL of 0.25% Marcaine   with epinephrine.  This was also done on the left side.  I made an   inframammary incision, dissected down to the breast tissue with electrocautery   until identified the lateral edge of the pectoralis major muscle.    Submuscular pocket was developed.  Hemostasis was obtained with   electrocautery.  Pocket was irrigated with copious amounts of normal saline   solution with triple antibiotic added.  I used a saline sizer on the right   breast and filled it up to 550 mL.  We felt that this was a good volume for   the patient.  Similar dissection was carried on the left breast and once we   had that pocket developed, I put the sizer over on that side,  opened up an   Allergan style SRM gel implant 560 mL.  It was placed on the right pocket   through a Stack funnel.  Pocket adjustments were made.  Once I was satisfied   with the shape and symmetry of the right breast, we took out the saline sizer   from the left side and chose another similar Allergan style SRM gel implants   550 mL.  It was again placed in the submuscular position through a Stack   funnel.  Patient was sat up, pocket adjustments were made.  The patient was   laid back down.  Pockets were irrigated one final time and then the incision   was closed with 3-0 Vicryl suture in a multilayered fashion and a 3-0 Monoderm   subcuticular stitch.  Steri-Strips were applied, 2-inch foam tape was placed   around the breast and she was placed in a comfort supportive bra.  She   tolerated the procedure well.       ____________________________________     MD MARIAMA SHERMAN / ROSELYN    DD:  02/24/2017 11:54:24  DT:  02/24/2017 14:52:04    D#:  [id number]  Job#:  [id number]

## 2017-03-09 RX ORDER — ALPRAZOLAM 0.25 MG/1
0.25 TABLET ORAL NIGHTLY PRN
Qty: 30 TAB | Refills: 0 | Status: SHIPPED | OUTPATIENT
Start: 2017-03-09 | End: 2017-06-05 | Stop reason: SDUPTHER

## 2017-03-09 NOTE — TELEPHONE ENCOUNTER
Was the patient seen in the last year in this department? Yes     Does patient have an active prescription for medications requested? No     Received Request Via: Pharmacy      in media.

## 2017-06-05 RX ORDER — ALPRAZOLAM 0.25 MG/1
0.25 TABLET ORAL NIGHTLY PRN
Qty: 30 TAB | Refills: 0 | Status: SHIPPED | OUTPATIENT
Start: 2017-06-05 | End: 2018-05-08

## 2017-07-24 DIAGNOSIS — Z00.00 HEALTH CARE MAINTENANCE: ICD-10-CM

## 2017-07-24 DIAGNOSIS — E03.1 CONGENITAL HYPOTHYROIDISM WITHOUT GOITER: ICD-10-CM

## 2017-07-24 RX ORDER — LEVOTHYROXINE SODIUM 0.12 MG/1
TABLET ORAL
Qty: 90 TAB | Refills: 0 | Status: SHIPPED | OUTPATIENT
Start: 2017-07-24 | End: 2017-08-28 | Stop reason: SDUPTHER

## 2017-07-24 NOTE — TELEPHONE ENCOUNTER
Was the patient seen in the last year in this department? Yes     Does patient have an active prescription for medications requested? Yes     Received Request Via: Pharmacy     Last office visit: 02/21/2017  Last labs: 02/22/2017

## 2017-08-01 ENCOUNTER — OFFICE VISIT (OUTPATIENT)
Dept: MEDICAL GROUP | Facility: MEDICAL CENTER | Age: 32
End: 2017-08-01
Payer: COMMERCIAL

## 2017-08-01 VITALS
TEMPERATURE: 98.1 F | HEIGHT: 60 IN | BODY MASS INDEX: 27.84 KG/M2 | OXYGEN SATURATION: 94 % | SYSTOLIC BLOOD PRESSURE: 120 MMHG | HEART RATE: 82 BPM | WEIGHT: 141.8 LBS | DIASTOLIC BLOOD PRESSURE: 66 MMHG

## 2017-08-01 DIAGNOSIS — F41.1 GAD (GENERALIZED ANXIETY DISORDER): ICD-10-CM

## 2017-08-01 DIAGNOSIS — E03.1 CONGENITAL HYPOTHYROIDISM WITHOUT GOITER: ICD-10-CM

## 2017-08-01 DIAGNOSIS — F31.62 BIPOLAR DISORDER, CURRENT EPISODE MIXED, MODERATE (HCC): ICD-10-CM

## 2017-08-01 DIAGNOSIS — F98.8 ADD (ATTENTION DEFICIT DISORDER): ICD-10-CM

## 2017-08-01 DIAGNOSIS — F41.0 PANIC ATTACKS: ICD-10-CM

## 2017-08-01 DIAGNOSIS — F33.1 MAJOR DEPRESSIVE DISORDER, RECURRENT, MODERATE (HCC): ICD-10-CM

## 2017-08-01 DIAGNOSIS — Z00.00 HEALTH CARE MAINTENANCE: ICD-10-CM

## 2017-08-01 PROCEDURE — 99215 OFFICE O/P EST HI 40 MIN: CPT | Performed by: INTERNAL MEDICINE

## 2017-08-01 ASSESSMENT — PATIENT HEALTH QUESTIONNAIRE - PHQ9
SUM OF ALL RESPONSES TO PHQ9 QUESTIONS 1 AND 2: 5
2. FEELING DOWN, DEPRESSED, IRRITABLE, OR HOPELESS: 2
3. TROUBLE FALLING OR STAYING ASLEEP OR SLEEPING TOO MUCH: 3
1. LITTLE INTEREST OR PLEASURE IN DOING THINGS: 3
7. TROUBLE CONCENTRATING ON THINGS, SUCH AS READING THE NEWSPAPER OR WATCHING TELEVISION: 3
5. POOR APPETITE OR OVEREATING: 2
9. THOUGHTS THAT YOU WOULD BE BETTER OFF DEAD, OR OF HURTING YOURSELF: 0
8. MOVING OR SPEAKING SO SLOWLY THAT OTHER PEOPLE COULD HAVE NOTICED. OR THE OPPOSITE, BEING SO FIGETY OR RESTLESS THAT YOU HAVE BEEN MOVING AROUND A LOT MORE THAN USUAL: 2
SUM OF ALL RESPONSES TO PHQ QUESTIONS 1-9: 17
4. FEELING TIRED OR HAVING LITTLE ENERGY: 2
6. FEELING BAD ABOUT YOURSELF - OR THAT YOU ARE A FAILURE OR HAVE LET YOURSELF OR YOUR FAMILY DOWN: 0

## 2017-08-01 NOTE — Clinical Note
[facility]  Deny Awad M.D.  [address]  [facility] [address]  Fax: [phone]   Authorization for Release/Disclosure of   Protected Health Information   Name: MIKHAIL NICKERSON : 1985 SSN: [id number]   Address: [address] [place] [address] Phone:    [phone] (home)    I authorize the entity listed below to release/disclose the PHI below to:   [facility]/Deny Awad M.D. and Deny Awad M.D.   Provider or Entity Name:     Address   City, State, Zip   Phone:      Fax:     Reason for request: continuity of care   Information to be released:    [  ] LAST COLONOSCOPY,  including any PATH REPORT and follow-up  [  ] LAST FIT/COLOGUARD RESULT [  ] LAST DEXA  [  ] LAST MAMMOGRAM  [ XXX ] LAST PAP  [  ] LAST LABS [  ] RETINA EXAM REPORT  [  ] IMMUNIZATION RECORDS  [  ] Release all info      [  ] Check here and initial the line next to each item to release ALL health information INCLUDING  _____ Care and treatment for drug and / or alcohol abuse  _____ HIV testing, infection status, or AIDS  _____ Genetic Testing    DATES OF SERVICE OR TIME PERIOD TO BE DISCLOSED: _____________  I understand and acknowledge that:  * This Authorization may be revoked at any time by you in writing, except if your health information has already been used or disclosed.  * Your health information that will be used or disclosed as a result of you signing this authorization could be re-disclosed by the recipient. If this occurs, your re-disclosed health information may no longer be protected by State or Federal laws.  * You may refuse to sign this Authorization. Your refusal will not affect your ability to obtain treatment.  * This Authorization becomes effective upon signing and will  on (date) __________.      If no date is indicated, this Authorization will  one (1) year from the signature date.    Name: Mikhail Nickerson    Signature:   Date:     2017          PLEASE FAX REQUESTED RECORDS BACK TO: [phone]

## 2017-08-28 ENCOUNTER — TELEPHONE (OUTPATIENT)
Dept: MEDICAL GROUP | Facility: MEDICAL CENTER | Age: 32
End: 2017-08-28

## 2017-08-28 DIAGNOSIS — E03.1 CONGENITAL HYPOTHYROIDISM WITHOUT GOITER: ICD-10-CM

## 2017-08-28 RX ORDER — LEVOTHYROXINE SODIUM 0.12 MG/1
TABLET ORAL
Qty: 90 TAB | Refills: 0 | Status: SHIPPED | OUTPATIENT
Start: 2017-08-28 | End: 2017-10-30 | Stop reason: SDUPTHER

## 2017-08-28 NOTE — TELEPHONE ENCOUNTER
Was the patient seen in the last year in this department? Yes     Does patient have an active prescription for medications requested? No     Received Request Via: Pharmacy        TSH 0.960 0.300 - 3.700 uIU/mL Final     01/07/2017

## 2017-10-30 DIAGNOSIS — E03.1 CONGENITAL HYPOTHYROIDISM WITHOUT GOITER: ICD-10-CM

## 2017-10-30 RX ORDER — LEVOTHYROXINE SODIUM 0.12 MG/1
TABLET ORAL
Qty: 180 TAB | Refills: 0 | Status: SHIPPED | OUTPATIENT
Start: 2017-10-30 | End: 2017-11-01 | Stop reason: SDUPTHER

## 2017-10-30 NOTE — TELEPHONE ENCOUNTER
Was the patient seen in the last year in this department? Yes     Does patient have an active prescription for medications requested? No     Received Request Via: Pharmacy     Last Visit: 8/1/17    Last TSH 1/7/17: 0.960

## 2017-11-01 ENCOUNTER — OFFICE VISIT (OUTPATIENT)
Dept: MEDICAL GROUP | Facility: MEDICAL CENTER | Age: 32
End: 2017-11-01
Payer: COMMERCIAL

## 2017-11-01 VITALS
HEIGHT: 60 IN | HEART RATE: 87 BPM | WEIGHT: 125 LBS | SYSTOLIC BLOOD PRESSURE: 120 MMHG | OXYGEN SATURATION: 100 % | BODY MASS INDEX: 24.54 KG/M2 | DIASTOLIC BLOOD PRESSURE: 78 MMHG | TEMPERATURE: 98.6 F

## 2017-11-01 DIAGNOSIS — Z00.00 HEALTH CARE MAINTENANCE: ICD-10-CM

## 2017-11-01 DIAGNOSIS — E03.1 CONGENITAL HYPOTHYROIDISM WITHOUT GOITER: ICD-10-CM

## 2017-11-01 PROCEDURE — 99214 OFFICE O/P EST MOD 30 MIN: CPT | Performed by: INTERNAL MEDICINE

## 2017-11-01 RX ORDER — LEVOTHYROXINE SODIUM 0.12 MG/1
TABLET ORAL
Qty: 180 TAB | Refills: 0 | Status: SHIPPED | OUTPATIENT
Start: 2017-11-01 | End: 2017-11-29 | Stop reason: SDUPTHER

## 2017-11-01 RX ORDER — DEXTROAMPHETAMINE SACCHARATE, AMPHETAMINE ASPARTATE, DEXTROAMPHETAMINE SULFATE AND AMPHETAMINE SULFATE 5; 5; 5; 5 MG/1; MG/1; MG/1; MG/1
20 TABLET ORAL 2 TIMES DAILY
COMMUNITY
End: 2017-11-01

## 2017-11-01 NOTE — PROGRESS NOTES
CHIEF COMPLAINT  Chief Complaint   Patient presents with   • Follow-Up   Hypothyroid    HPI  Patient is a 32 y.o. female patient who presents today for the following     Congenital hypothyroidism  Dg/Onset: congenital  Levothyroxine, 250 mcg, taking daily early in am, before any po intake.  No temperature intolerance. No change in weight,  hair/skin quality, BMs.    No tremors, weakness.  No peripheral swelling.  No mood changes.  She gave birth to baby boy 2 months ago.    Reviewed the last TSH, not recent, with low TSH, normal FT4.    FH: multiple    Reviewed PMH, PSH, FH, SH, ALL, HCM/IMM, no changes  Reviewed MEDS, no changes    Patient Active Problem List    Diagnosis Date Noted   • Encounter for cosmetic surgery 02/24/2017   • Major depressive disorder, recurrent, moderate (CMS-HCC) 01/18/2017   • VALENTE (generalized anxiety disorder) 12/08/2016   • Panic attacks 12/08/2016   • Congenital hypothyroidism without goiter 11/04/2015   • Health care maintenance 11/04/2015     CURRENT MEDICATIONS  Current Outpatient Prescriptions   Medication Sig Dispense Refill   • amphetamine-dextroamphetamine (ADDERALL, 20MG,) 20 MG Tab Take 20 mg by mouth 2 times a day.     • levothyroxine (SYNTHROID) 125 MCG Tab TAKE 1 TABLET BY MOUTH TWICE A  Tab 0   • alprazolam (XANAX) 0.25 MG Tab Take 1 Tab by mouth at bedtime as needed for Sleep. 30 Tab 0   • sertraline (ZOLOFT) 100 MG Tab Take 1 Tab by mouth every day. 90 Tab 0     No current facility-administered medications for this visit.      ALLERGIES  Allergies: Review of patient's allergies indicates no known allergies.  PAST MEDICAL HISTORY  Past Medical History:   Diagnosis Date   • ADD (attention deficit disorder)    • Bipolar affective (CMS-HCC)    • Depression    • VALENTE (generalized anxiety disorder)    • History of anemia    • Hypothyroidism     congenital- Hypothyroid   • Major depression      SURGICAL HISTORY  She  has a past surgical history that includes gastric bypass  laparoscopic (2012); imary c section (7/16/2011); imary c section (8/3/2015); abdominoplasty (3/21/2016); and mammoplasty augmentation (Bilateral, 2/24/2017).  SOCIAL HISTORY  Social History   Substance Use Topics   • Smoking status: Light Tobacco Smoker     Packs/day: 0.10     Years: 6.00     Types: Cigarettes     Last attempt to quit: 12/1/2014   • Smokeless tobacco: Never Used      Comment: 1 cig per day   • Alcohol use No      Comment: 1-2 per month     Social History     Social History Narrative    Lives with  and 2 sons.     Work: stay at home mom     FAMILY HISTORY  Family History   Problem Relation Age of Onset   • Hypertension Mother    • Hyperlipidemia Mother    • Cancer Mother      breast   • Cancer Maternal Grandmother      breast   • Cancer Maternal Grandfather      leukemia   • Diabetes Father    • Diabetes Paternal Aunt    • Heart Disease Neg Hx    • Stroke Neg Hx    • Thyroid Paternal Grandmother    • Psychiatry Mother      Depression   • Psychiatry Sister      Panic attacks     Family Status   Relation Status   • Mother Alive   • Father Alive     ROS   Constitutional: Negative for fever, chills.  HENT: Negative for congestion, sore throat.  Eyes: Negative for blurred vision.   Respiratory: Negative for cough, shortness of breath.  Cardiovascular: Negative for chest pain, palpitations.   Gastrointestinal: Negative for heartburn, nausea, abdominal pain.   Genitourinary: Negative for dysuria.  Musculoskeletal: Negative for significant myalgias, back pain and joint pain.   Skin: Negative for rash and itching.   Neuro: Negative for dizziness, weakness and headaches.   Endo/Heme/Allergies: Does not bruise/bleed easily. And per HPI.  Psychiatric/Behavioral: h/o anxiety, depression, ADHD    PHYSICAL EXAM   Blood pressure 120/78, pulse 87, temperature 37 °C (98.6 °F), height 1.524 m (5'), weight 56.7 kg (125 lb), last menstrual period 10/01/2017, SpO2 100 %, not currently breastfeeding. Body mass  index is 24.41 kg/m².  General:  NAD, well appearing  HEENT:   NC/AT, PERRLA, EOMI, TMs are clear. Oropharyngeal mucosa is pink,  without lesions;  no cervical / supraclavicular  lymphadenopathy, no thyromegaly.    Cardiovascular: RRR.   No m/r/g. No carotid bruits .      Lungs:   CTAB, no w/r/r, no respiratory distress.  Abdomen: Soft, NT/ND + BS; no suprapubic tenderness; no masses or hepatosplenomegaly.  Extremities:  2+ DP and radial pulses bilaterally.  No c/c/e.   Skin:  Warm, dry.  No erythema. No rash.   Neurologic: Alert & oriented x 3.  No focal deficits.  Psychiatric:  Affect normal, mood normal, judgment normal.    LABS     Labs are reviewed and discussed with a patient     Ref. Range 11/4/2015 11:39 5/17/2016 10:54 1/7/2017 08:45   TSH  0.300 - 3.700 uIU/mL 0.020 (L)  0.960   Free T-4 0.53 - 1.43 ng/dL 1.55 (H) 1.09 0.50 (L)     IMAGING     None    ASSESMENT AND PLAN        1. Congenital hypothyroidism without goiter  Pending TSH, continue current dose of levothyroxine  - TSH; Future  - FREE THYROXINE; Future  - levothyroxine (SYNTHROID) 125 MCG Tab; TAKE 1 TABLET BY MOUTH TWICE A DAY  Dispense: 180 Tab; Refill: 0    2. Health care maintenance  Need to schedule Pap smear.   declined all immunizations.      Counseling:   - Smoking:  Advised to quit smoking    Followup: in 3 months    All questions are answered.    Please note that this dictation was created using voice recognition software, and that there might be errors of merry and possibly content.

## 2017-11-02 ENCOUNTER — HOSPITAL ENCOUNTER (OUTPATIENT)
Dept: LAB | Facility: MEDICAL CENTER | Age: 32
End: 2017-11-02
Attending: INTERNAL MEDICINE
Payer: COMMERCIAL

## 2017-11-02 DIAGNOSIS — E03.1 CONGENITAL HYPOTHYROIDISM WITHOUT GOITER: ICD-10-CM

## 2017-11-02 LAB
T4 FREE SERPL-MCNC: 0.38 NG/DL (ref 0.53–1.43)
TSH SERPL DL<=0.005 MIU/L-ACNC: 6.31 UIU/ML (ref 0.3–3.7)

## 2017-11-02 PROCEDURE — 84439 ASSAY OF FREE THYROXINE: CPT

## 2017-11-02 PROCEDURE — 84443 ASSAY THYROID STIM HORMONE: CPT

## 2017-11-02 PROCEDURE — 36415 COLL VENOUS BLD VENIPUNCTURE: CPT

## 2017-11-03 ENCOUNTER — TELEPHONE (OUTPATIENT)
Dept: MEDICAL GROUP | Facility: MEDICAL CENTER | Age: 32
End: 2017-11-03

## 2017-11-03 NOTE — TELEPHONE ENCOUNTER
Please, notify patient to take 1 tab of levothyroxine, 125 µg for one day in a week, and regular dose for another 6 days in a week.  To check labs with subsequent office visit in 3 months.   Thanks, Dr Méndez

## 2017-11-29 DIAGNOSIS — E03.1 CONGENITAL HYPOTHYROIDISM WITHOUT GOITER: ICD-10-CM

## 2017-11-29 RX ORDER — LEVOTHYROXINE SODIUM 0.12 MG/1
TABLET ORAL
Qty: 180 TAB | Refills: 0 | Status: SHIPPED | OUTPATIENT
Start: 2017-11-29 | End: 2018-04-17 | Stop reason: SDUPTHER

## 2017-12-28 ENCOUNTER — APPOINTMENT (OUTPATIENT)
Dept: MEDICAL GROUP | Facility: MEDICAL CENTER | Age: 32
End: 2017-12-28
Payer: COMMERCIAL

## 2018-03-11 ENCOUNTER — OFFICE VISIT (OUTPATIENT)
Dept: URGENT CARE | Facility: CLINIC | Age: 33
End: 2018-03-11
Payer: COMMERCIAL

## 2018-03-11 VITALS
RESPIRATION RATE: 14 BRPM | TEMPERATURE: 97.5 F | HEART RATE: 104 BPM | HEIGHT: 60 IN | OXYGEN SATURATION: 100 % | BODY MASS INDEX: 24.74 KG/M2 | WEIGHT: 126 LBS | DIASTOLIC BLOOD PRESSURE: 70 MMHG | SYSTOLIC BLOOD PRESSURE: 102 MMHG

## 2018-03-11 DIAGNOSIS — J02.0 STREP PHARYNGITIS: ICD-10-CM

## 2018-03-11 LAB
INT CON NEG: NORMAL
INT CON POS: NORMAL
S PYO AG THROAT QL: POSITIVE

## 2018-03-11 PROCEDURE — 87880 STREP A ASSAY W/OPTIC: CPT | Performed by: PHYSICIAN ASSISTANT

## 2018-03-11 PROCEDURE — 99204 OFFICE O/P NEW MOD 45 MIN: CPT | Performed by: PHYSICIAN ASSISTANT

## 2018-03-11 RX ORDER — AMOXICILLIN 875 MG/1
875 TABLET, COATED ORAL 2 TIMES DAILY
Qty: 20 TAB | Refills: 0 | Status: SHIPPED | OUTPATIENT
Start: 2018-03-11 | End: 2018-03-11 | Stop reason: SDUPTHER

## 2018-03-11 RX ORDER — METHYLPREDNISOLONE 4 MG/1
TABLET ORAL
Qty: 21 TAB | Refills: 0 | Status: SHIPPED | OUTPATIENT
Start: 2018-03-11 | End: 2018-03-11 | Stop reason: SDUPTHER

## 2018-03-11 RX ORDER — AMOXICILLIN 875 MG/1
875 TABLET, COATED ORAL 2 TIMES DAILY
Qty: 20 TAB | Refills: 0 | Status: SHIPPED | OUTPATIENT
Start: 2018-03-11 | End: 2018-03-21

## 2018-03-11 RX ORDER — DEXTROAMPHETAMINE SULFATE, DEXTROAMPHETAMINE SACCHARATE, AMPHETAMINE SULFATE AND AMPHETAMINE ASPARTATE 5; 5; 5; 5 MG/1; MG/1; MG/1; MG/1
CAPSULE, EXTENDED RELEASE ORAL
COMMUNITY
Start: 2018-02-13 | End: 2020-08-05

## 2018-03-11 RX ORDER — METHYLPREDNISOLONE 4 MG/1
TABLET ORAL
Qty: 21 TAB | Refills: 0 | Status: SHIPPED | OUTPATIENT
Start: 2018-03-11 | End: 2018-05-08

## 2018-03-11 ASSESSMENT — ENCOUNTER SYMPTOMS
CHILLS: 1
NECK PAIN: 0
SORE THROAT: 1
FEVER: 1
SENSORY CHANGE: 0
HOARSE VOICE: 0
MYALGIAS: 1
TINGLING: 0
SPUTUM PRODUCTION: 0
PALPITATIONS: 0
FOCAL WEAKNESS: 0
SWOLLEN GLANDS: 1
COUGH: 0
WHEEZING: 0
DIARRHEA: 0
SHORTNESS OF BREATH: 0
ABDOMINAL PAIN: 0
HEADACHES: 0
NAUSEA: 0
VOMITING: 0

## 2018-03-11 NOTE — PROGRESS NOTES
Subjective:      Isabel Curry is a 32 y.o. female who presents with Pharyngitis            Pharyngitis    This is a new problem. The current episode started more than 1 year ago (2 days). The problem has been unchanged. Maximum temperature: subjective  The pain is moderate. Associated symptoms include ear pain (left ear) and swollen glands. Pertinent negatives include no abdominal pain, congestion, coughing, diarrhea, ear discharge, headaches, hoarse voice, plugged ear sensation, neck pain, shortness of breath or vomiting. She has tried cool liquids for the symptoms. The treatment provided no relief.       Past Medical History:   Diagnosis Date   • ADD (attention deficit disorder)    • Bipolar affective (CMS-HCC)    • Depression    • VALENTE (generalized anxiety disorder)    • History of anemia    • Hypothyroidism     congenital- Hypothyroid   • Major depression        Past Surgical History:   Procedure Laterality Date   • MAMMOPLASTY AUGMENTATION Bilateral 2017    Procedure: MAMMOPLASTY AUGMENTATION;  Surgeon: Isaias Mix M.D.;  Location: SURGERY [facility];  Service:    • ABDOMINOPLASTY  3/21/2016   • PRIMARY C SECTION  8/3/2015       • GASTRIC BYPASS LAPAROSCOPIC     • PRIMARY C SECTION  2011           Family History   Problem Relation Age of Onset   • Hypertension Mother    • Hyperlipidemia Mother    • Cancer Mother      breast   • Psychiatry Mother      Depression   • Diabetes Father    • Cancer Maternal Grandmother      breast   • Cancer Maternal Grandfather      leukemia   • Diabetes Paternal Aunt    • Thyroid Paternal Grandmother    • Psychiatry Sister      Panic attacks   • Heart Disease Neg Hx    • Stroke Neg Hx        No Known Allergies     Medications, Allergies, and current problem list reviewed today in Epic    Review of Systems   Constitutional: Positive for chills, fever and malaise/fatigue.   HENT: Positive for ear pain (left ear) and sore throat.  Negative for congestion, ear discharge and hoarse voice.    Respiratory: Negative for cough, sputum production, shortness of breath and wheezing.    Cardiovascular: Negative for chest pain, palpitations and leg swelling.   Gastrointestinal: Negative for abdominal pain, diarrhea, nausea and vomiting.   Musculoskeletal: Positive for myalgias. Negative for neck pain.   Neurological: Negative for tingling, sensory change, focal weakness and headaches.     All other systems reviewed and are negative.        Objective:     /70   Pulse (!) 104   Temp 36.4 °C (97.5 °F)   Resp 14   Ht 1.524 m (5')   Wt 57.2 kg (126 lb)   SpO2 100%   BMI 24.61 kg/m²      Physical Exam   Constitutional: She is oriented to person, place, and time. She appears well-developed and well-nourished. No distress.   HENT:   Head: Normocephalic and atraumatic.   Right Ear: Tympanic membrane, external ear and ear canal normal.   Left Ear: Tympanic membrane, external ear and ear canal normal.   Nose: Nose normal.   Mouth/Throat: Uvula is midline and mucous membranes are normal. Posterior oropharyngeal erythema present. No oropharyngeal exudate or tonsillar abscesses. Tonsils are 0 on the right. Tonsils are 0 on the left. No tonsillar exudate.   Eyes: Conjunctivae are normal.   Neck: Neck supple.   Cardiovascular: Normal rate, regular rhythm and normal heart sounds.  Exam reveals no gallop and no friction rub.    No murmur heard.  Pulmonary/Chest: Effort normal and breath sounds normal. No respiratory distress. She has no wheezes. She has no rales.   Lymphadenopathy:     She has cervical adenopathy (moderate anterior cervical adenopathy. L>R).   Neurological: She is alert and oriented to person, place, and time. No cranial nerve deficit.   Skin: Skin is warm and dry. No rash noted. No erythema.   Psychiatric: She has a normal mood and affect. Her behavior is normal. Judgment and thought content normal.               Assessment/Plan:     1.  Strep pharyngitis    - POCT Rapid Strep A - Positive     •  amoxicillin (AMOXIL) 875 MG tablet, Take 1 Tab by mouth 2 times a day for 10 days., Disp: 20 Tab, Rfl: 0  •  MethylPREDNISolone (MEDROL DOSEPAK) 4 MG Tablet Therapy Pack, Take as directed on package. 1 pack., Disp: 21 Tab, Rfl: 0    - encouraged fluids, salt water gargles, throat lozenges.     Differential diagnoses, Supportive care, and indications for immediate follow-up discussed with patient.   Instructed to return to clinic or nearest emergency department for any change in condition, further concerns, or worsening of symptoms.    The patient demonstrated a good understanding and agreed with the treatment plan.    Merry Melton P.A.-C.

## 2018-04-17 DIAGNOSIS — E03.1 CONGENITAL HYPOTHYROIDISM WITHOUT GOITER: ICD-10-CM

## 2018-04-17 RX ORDER — LEVOTHYROXINE SODIUM 0.12 MG/1
TABLET ORAL
Qty: 60 TAB | Refills: 0 | Status: SHIPPED | OUTPATIENT
Start: 2018-04-17 | End: 2018-05-08 | Stop reason: SDUPTHER

## 2018-05-08 ENCOUNTER — HOSPITAL ENCOUNTER (OUTPATIENT)
Facility: MEDICAL CENTER | Age: 33
End: 2018-05-08
Attending: INTERNAL MEDICINE
Payer: COMMERCIAL

## 2018-05-08 ENCOUNTER — OFFICE VISIT (OUTPATIENT)
Dept: MEDICAL GROUP | Age: 33
End: 2018-05-08
Payer: COMMERCIAL

## 2018-05-08 VITALS
SYSTOLIC BLOOD PRESSURE: 115 MMHG | BODY MASS INDEX: 26.31 KG/M2 | OXYGEN SATURATION: 95 % | TEMPERATURE: 98.8 F | HEIGHT: 60 IN | DIASTOLIC BLOOD PRESSURE: 78 MMHG | HEART RATE: 111 BPM | WEIGHT: 134 LBS

## 2018-05-08 DIAGNOSIS — R30.9 PAINFUL URINATION: ICD-10-CM

## 2018-05-08 DIAGNOSIS — F90.2 ATTENTION DEFICIT HYPERACTIVITY DISORDER (ADHD), COMBINED TYPE: ICD-10-CM

## 2018-05-08 DIAGNOSIS — Z98.84 S/P GASTRIC BYPASS: ICD-10-CM

## 2018-05-08 DIAGNOSIS — J02.0 PHARYNGITIS DUE TO STREPTOCOCCUS SPECIES: ICD-10-CM

## 2018-05-08 DIAGNOSIS — E03.1 CONGENITAL HYPOTHYROIDISM WITHOUT GOITER: ICD-10-CM

## 2018-05-08 DIAGNOSIS — D50.0 IRON DEFICIENCY ANEMIA DUE TO CHRONIC BLOOD LOSS: ICD-10-CM

## 2018-05-08 DIAGNOSIS — N30.00 ACUTE CYSTITIS WITHOUT HEMATURIA: ICD-10-CM

## 2018-05-08 DIAGNOSIS — E55.9 VITAMIN D DEFICIENCY: ICD-10-CM

## 2018-05-08 PROBLEM — Z41.1 ENCOUNTER FOR COSMETIC SURGERY: Status: RESOLVED | Noted: 2017-02-24 | Resolved: 2018-05-08

## 2018-05-08 PROBLEM — F33.1 MAJOR DEPRESSIVE DISORDER, RECURRENT, MODERATE (HCC): Status: RESOLVED | Noted: 2017-01-18 | Resolved: 2018-05-08

## 2018-05-08 LAB
APPEARANCE UR: CLEAR
BILIRUB UR STRIP-MCNC: NEGATIVE MG/DL
COLOR UR AUTO: NORMAL
GLUCOSE UR STRIP.AUTO-MCNC: NEGATIVE MG/DL
INT CON NEG: NEGATIVE
INT CON POS: POSITIVE
KETONES UR STRIP.AUTO-MCNC: NORMAL MG/DL
LEUKOCYTE ESTERASE UR QL STRIP.AUTO: NORMAL
NITRITE UR QL STRIP.AUTO: NEGATIVE
PH UR STRIP.AUTO: 7.5 [PH] (ref 5–8)
PROT UR QL STRIP: NEGATIVE MG/DL
RBC UR QL AUTO: NORMAL
S PYO AG THROAT QL: POSITIVE
SP GR UR STRIP.AUTO: 1.01
UROBILINOGEN UR STRIP-MCNC: NEGATIVE MG/DL

## 2018-05-08 PROCEDURE — 99215 OFFICE O/P EST HI 40 MIN: CPT | Performed by: INTERNAL MEDICINE

## 2018-05-08 PROCEDURE — 81002 URINALYSIS NONAUTO W/O SCOPE: CPT | Performed by: INTERNAL MEDICINE

## 2018-05-08 PROCEDURE — 81001 URINALYSIS AUTO W/SCOPE: CPT

## 2018-05-08 PROCEDURE — 87880 STREP A ASSAY W/OPTIC: CPT | Performed by: INTERNAL MEDICINE

## 2018-05-08 RX ORDER — LEVOTHYROXINE SODIUM 0.12 MG/1
TABLET ORAL
Qty: 60 TAB | Refills: 0 | Status: SHIPPED | OUTPATIENT
Start: 2018-05-08 | End: 2018-05-08 | Stop reason: SDUPTHER

## 2018-05-08 RX ORDER — AMOXICILLIN AND CLAVULANATE POTASSIUM 875; 125 MG/1; MG/1
1 TABLET, FILM COATED ORAL 2 TIMES DAILY
Qty: 20 TAB | Refills: 1 | Status: SHIPPED | OUTPATIENT
Start: 2018-05-08 | End: 2019-07-16

## 2018-05-08 RX ORDER — PENICILLIN V POTASSIUM 500 MG/1
500 TABLET ORAL 4 TIMES DAILY
Qty: 40 TAB | Refills: 0 | Status: SHIPPED | OUTPATIENT
Start: 2018-05-08 | End: 2018-05-08

## 2018-05-08 RX ORDER — LEVOTHYROXINE SODIUM 0.12 MG/1
250 TABLET ORAL
Qty: 180 TAB | Refills: 4 | Status: SHIPPED | OUTPATIENT
Start: 2018-05-08 | End: 2018-07-10 | Stop reason: SDUPTHER

## 2018-05-08 ASSESSMENT — PATIENT HEALTH QUESTIONNAIRE - PHQ9: CLINICAL INTERPRETATION OF PHQ2 SCORE: 0

## 2018-05-08 ASSESSMENT — ENCOUNTER SYMPTOMS
SORE THROAT: 1
EYES NEGATIVE: 1
CONSTITUTIONAL NEGATIVE: 1
CARDIOVASCULAR NEGATIVE: 1
GASTROINTESTINAL NEGATIVE: 1
MUSCULOSKELETAL NEGATIVE: 1
RESPIRATORY NEGATIVE: 1
PSYCHIATRIC NEGATIVE: 1
NEUROLOGICAL NEGATIVE: 1

## 2018-05-08 NOTE — PROGRESS NOTES
Subjective:      Isabel Curry is a 32 y.o. female who presents with Pharyngitis (x1 day) and Dysuria (x1 day)        And  The patient is here for followup of chronic medical problems listed below. The patient is compliant with medications and having no side effects from them. Denies chest pain, abdominal pain, dyspnea, myalgias, or cough.   Patient Active Problem List    Diagnosis Date Noted   • Iron deficiency anemia due to chronic blood loss 05/08/2018   • Attention deficit hyperactivity disorder (ADHD), combined type- dr bethanie gray, psychiatrist 05/08/2018   • Vitamin D deficiency 05/08/2018   • Congenital hypothyroidism without goiter 11/04/2015     Patient has no known allergies.  Outpatient Medications Prior to Visit   Medication Sig Dispense Refill   • levothyroxine (SYNTHROID) 125 MCG Tab TAKE 1 TABLET BY MOUTH TWICE A DAY 60 Tab 0   • ADDERALL XR, 20MG, 20 MG per XR capsule      • MethylPREDNISolone (MEDROL DOSEPAK) 4 MG Tablet Therapy Pack Take as directed on package. 1 pack. 21 Tab 0   • alprazolam (XANAX) 0.25 MG Tab Take 1 Tab by mouth at bedtime as needed for Sleep. 30 Tab 0   • sertraline (ZOLOFT) 100 MG Tab Take 1 Tab by mouth every day. 90 Tab 0     No facility-administered medications prior to visit.                HPI    Review of Systems   Constitutional: Negative.    HENT: Positive for sore throat.    Eyes: Negative.    Respiratory: Negative.    Cardiovascular: Negative.    Gastrointestinal: Negative.    Genitourinary: Positive for dysuria, frequency and urgency.   Musculoskeletal: Negative.    Skin: Negative.    Neurological: Negative.    Endo/Heme/Allergies: Negative.    Psychiatric/Behavioral: Negative.           Objective:     /78   Pulse (!) 111   Temp 37.1 °C (98.8 °F)   Ht 1.524 m (5')   Wt 60.8 kg (134 lb)   SpO2 95%   BMI 26.17 kg/m²      Physical Exam   Constitutional: She is oriented to person, place, and time. She appears well-developed and well-nourished. No  distress.   HENT:   Head: Normocephalic and atraumatic.   Right Ear: External ear normal.   Left Ear: External ear normal.   Nose: Nose normal.   Mouth/Throat: No oropharyngeal exudate.   The patient has tonsillar swelling with purulent exudates and severe erythema but no peritonsillar abscess..  There is mild cervical lymph node tenderness or enlargement.  Thyroids normal.  No enlargement or tenderness of the thyroid.   Eyes: Conjunctivae and EOM are normal. Pupils are equal, round, and reactive to light. Right eye exhibits no discharge. Left eye exhibits no discharge. No scleral icterus.   Neck: Normal range of motion. Neck supple. No JVD present. No tracheal deviation present. No thyromegaly present.   Cardiovascular: Normal rate, regular rhythm, normal heart sounds and intact distal pulses.  Exam reveals no gallop and no friction rub.    No murmur heard.  Pulmonary/Chest: Effort normal and breath sounds normal. No stridor. No respiratory distress. She has no wheezes. She has no rales. She exhibits no tenderness.   Abdominal: Soft. Bowel sounds are normal. She exhibits no distension and no mass. There is no tenderness. There is no rebound and no guarding.   Musculoskeletal: Normal range of motion. She exhibits no edema or tenderness.   Lymphadenopathy:     She has no cervical adenopathy.   Neurological: She is alert and oriented to person, place, and time. She has normal reflexes. She displays normal reflexes. No cranial nerve deficit. She exhibits normal muscle tone. Coordination normal.   Skin: Skin is warm and dry. No rash noted. She is not diaphoretic. No erythema. No pallor.   Psychiatric: She has a normal mood and affect. Her behavior is normal. Judgment and thought content normal.   Vitals reviewed.    No visits with results within 1 Month(s) from this visit.   Latest known visit with results is:   Office Visit on 03/11/2018   Component Date Value   • Rapid Strep Screen 03/11/2018 POSITIVE    • Internal  Control Positive 03/11/2018 Valid    • Internal Control Negative 03/11/2018 Valid       No results found for: HBA1C  No results found for: SODIUM, POTASSIUM, CHLORIDE, CO2, GLUCOSE, BUN, CREATININE, BUNCREATRAT, GLOMRATE, ALKPHOSPHAT, ASTSGOT, ALTSGPT, TBILIRUBIN, ALB  No results found for: INR  No results found for: CHOLSTRLTOT, LDL, HDL, TRIGLYCERIDE    No results found for: TESTOSTERONE  No results found for: TSH  Lab Results   Component Value Date/Time    FREET4 0.38 (L) 11/02/2017 12:05 PM    FREET4 0.50 (L) 01/07/2017 08:45 AM     No results found for: URICACID  No components found for: VITB12  No results found for: 25HYDROXY  Beta strep screen throat swab is positive for strep    Dipstick UA shows 3+ WBCs and leukocyte esterase            Assessment/Plan:     1. Pharyngitis due to Streptococcus species      - POCT Rapid Strep A  - amoxicillin-clavulanate (AUGMENTIN) 875-125 MG Tab; Take 1 Tab by mouth 2 times a day.  Dispense: 20 Tab; Refill: 1    2. Acute cystitis without hematuria  We will check urine culture to make sure the Augmentin covers her UTI.  - amoxicillin-clavulanate (AUGMENTIN) 875-125 MG Tab; Take 1 Tab by mouth 2 times a day.  Dispense: 20 Tab; Refill: 1  - URINALYSIS,CULTURE IF INDICATED; Future    3. Iron deficiency anemia due to chronic blood loss  Probably secondary to poor iron absorption from gastric bypass as well as excessive blood loss from heavy menstruation.  Begin iron replacement.  Check iron studies.  - CBC WITH DIFFERENTIAL; Future  - IRON/TOTAL IRON BIND; Future  - FERRITIN; Future  - VITAMIN B12; Future  - FOLATE; Future  - ferrous sulfate (FEOSOL) 220 (44 Fe) MG/5ML Elixir; Take 5 mL by mouth 2 times a day.  Dispense: 1 Bottle; Refill: 11    4. Congenital hypothyroidism without goiter   Under good control. Continue same regimen.    - TSH; Future  - COMP METABOLIC PANEL; Future  - LIPID PROFILE; Future  - levothyroxine (SYNTHROID) 125 MCG Tab; Take 2 Tabs by mouth Every  morning on an empty stomach. TAKE 1 TABLET BY MOUTH TWICE A DAY  Dispense: 180 Tab; Refill: 4    5. Attention deficit hyperactivity disorder (ADHD), combined type   Under good control. Continue same regimen.   managed by psychiatrist and controlled on Adderall.    6. Vitamin D deficiency  Need to recheck level.  Replace as needed.  - VITAMIN D,25 HYDROXY; Future    7. Painful urination        Secondary to UTI.  We will culture and start on Augmentin which will cover both her strep pharyngitis and UTI.  - POCT Urinalysis    8.  Status post gastric bypass-continue on vitamin supplements.  Check labs.    Under good control. Continue same regimen.  Patient has lost over 100 pounds since her bypass a few years ago.    45 minute face-to-face encounter took place today.  More than half of this time was spent in the coordination of care of the above problems, as well as counseling.

## 2018-05-09 DIAGNOSIS — N30.00 ACUTE CYSTITIS WITHOUT HEMATURIA: ICD-10-CM

## 2018-05-09 LAB
APPEARANCE UR: CLEAR
BACTERIA #/AREA URNS HPF: NEGATIVE /HPF
BILIRUB UR QL STRIP.AUTO: NEGATIVE
COLOR UR: YELLOW
EPI CELLS #/AREA URNS HPF: ABNORMAL /HPF
GLUCOSE UR STRIP.AUTO-MCNC: NEGATIVE MG/DL
HYALINE CASTS #/AREA URNS LPF: ABNORMAL /LPF
KETONES UR STRIP.AUTO-MCNC: NEGATIVE MG/DL
LEUKOCYTE ESTERASE UR QL STRIP.AUTO: ABNORMAL
MICRO URNS: ABNORMAL
NITRITE UR QL STRIP.AUTO: NEGATIVE
PH UR STRIP.AUTO: 6.5 [PH]
PROT UR QL STRIP: NEGATIVE MG/DL
RBC # URNS HPF: ABNORMAL /HPF
RBC UR QL AUTO: ABNORMAL
SP GR UR STRIP.AUTO: 1.01
UROBILINOGEN UR STRIP.AUTO-MCNC: 0.2 MG/DL
WBC #/AREA URNS HPF: ABNORMAL /HPF

## 2018-07-02 ENCOUNTER — HOSPITAL ENCOUNTER (OUTPATIENT)
Dept: LAB | Facility: MEDICAL CENTER | Age: 33
End: 2018-07-02
Attending: INTERNAL MEDICINE
Payer: COMMERCIAL

## 2018-07-02 DIAGNOSIS — D50.0 IRON DEFICIENCY ANEMIA DUE TO CHRONIC BLOOD LOSS: ICD-10-CM

## 2018-07-02 DIAGNOSIS — E03.1 CONGENITAL HYPOTHYROIDISM WITHOUT GOITER: ICD-10-CM

## 2018-07-02 DIAGNOSIS — E55.9 VITAMIN D DEFICIENCY: ICD-10-CM

## 2018-07-02 LAB
25(OH)D3 SERPL-MCNC: 26 NG/ML (ref 30–100)
ALBUMIN SERPL BCP-MCNC: 4.1 G/DL (ref 3.2–4.9)
ALBUMIN/GLOB SERPL: 1 G/DL
ALP SERPL-CCNC: 70 U/L (ref 30–99)
ALT SERPL-CCNC: 17 U/L (ref 2–50)
ANION GAP SERPL CALC-SCNC: 9 MMOL/L (ref 0–11.9)
ANISOCYTOSIS BLD QL SMEAR: ABNORMAL
AST SERPL-CCNC: 28 U/L (ref 12–45)
BASOPHILS # BLD AUTO: 1.8 % (ref 0–1.8)
BASOPHILS # BLD: 0.14 K/UL (ref 0–0.12)
BILIRUB SERPL-MCNC: 0.5 MG/DL (ref 0.1–1.5)
BUN SERPL-MCNC: 20 MG/DL (ref 8–22)
CALCIUM SERPL-MCNC: 9.6 MG/DL (ref 8.5–10.5)
CHLORIDE SERPL-SCNC: 101 MMOL/L (ref 96–112)
CHOLEST SERPL-MCNC: 244 MG/DL (ref 100–199)
CO2 SERPL-SCNC: 24 MMOL/L (ref 20–33)
CREAT SERPL-MCNC: 0.96 MG/DL (ref 0.5–1.4)
EOSINOPHIL # BLD AUTO: 0.21 K/UL (ref 0–0.51)
EOSINOPHIL NFR BLD: 2.7 % (ref 0–6.9)
ERYTHROCYTE [DISTWIDTH] IN BLOOD BY AUTOMATED COUNT: 45.2 FL (ref 35.9–50)
FERRITIN SERPL-MCNC: 3.3 NG/ML (ref 10–291)
FOLATE SERPL-MCNC: 18.2 NG/ML
GLOBULIN SER CALC-MCNC: 4.3 G/DL (ref 1.9–3.5)
GLUCOSE SERPL-MCNC: 91 MG/DL (ref 65–99)
HCT VFR BLD AUTO: 30.8 % (ref 37–47)
HDLC SERPL-MCNC: 87 MG/DL
HGB BLD-MCNC: 8.2 G/DL (ref 12–16)
HYPOCHROMIA BLD QL SMEAR: ABNORMAL
IRON SATN MFR SERPL: ABNORMAL % (ref 15–55)
IRON SERPL-MCNC: <10 UG/DL (ref 40–170)
LDLC SERPL CALC-MCNC: 132 MG/DL
LYMPHOCYTES # BLD AUTO: 4.43 K/UL (ref 1–4.8)
LYMPHOCYTES NFR BLD: 56.8 % (ref 22–41)
MANUAL DIFF BLD: NORMAL
MCH RBC QN AUTO: 18.1 PG (ref 27–33)
MCHC RBC AUTO-ENTMCNC: 26.6 G/DL (ref 33.6–35)
MCV RBC AUTO: 68 FL (ref 81.4–97.8)
MICROCYTES BLD QL SMEAR: ABNORMAL
MONOCYTES # BLD AUTO: 0.21 K/UL (ref 0–0.85)
MONOCYTES NFR BLD AUTO: 2.7 % (ref 0–13.4)
MORPHOLOGY BLD-IMP: NORMAL
NEUTROPHILS # BLD AUTO: 2.81 K/UL (ref 2–7.15)
NEUTROPHILS NFR BLD: 36 % (ref 44–72)
NRBC # BLD AUTO: 0 K/UL
NRBC BLD-RTO: 0 /100 WBC
OVALOCYTES BLD QL SMEAR: NORMAL
PLATELET # BLD AUTO: 624 K/UL (ref 164–446)
PLATELET BLD QL SMEAR: NORMAL
PMV BLD AUTO: 9.9 FL (ref 9–12.9)
POIKILOCYTOSIS BLD QL SMEAR: NORMAL
POTASSIUM SERPL-SCNC: 3.8 MMOL/L (ref 3.6–5.5)
PROT SERPL-MCNC: 8.4 G/DL (ref 6–8.2)
RBC # BLD AUTO: 4.53 M/UL (ref 4.2–5.4)
RBC BLD AUTO: PRESENT
SCHISTOCYTES BLD QL SMEAR: NORMAL
SODIUM SERPL-SCNC: 134 MMOL/L (ref 135–145)
TIBC SERPL-MCNC: 658 UG/DL (ref 250–450)
TRIGL SERPL-MCNC: 125 MG/DL (ref 0–149)
TSH SERPL DL<=0.005 MIU/L-ACNC: 169.35 UIU/ML (ref 0.38–5.33)
VIT B12 SERPL-MCNC: 179 PG/ML (ref 211–911)
WBC # BLD AUTO: 7.8 K/UL (ref 4.8–10.8)

## 2018-07-02 PROCEDURE — 82728 ASSAY OF FERRITIN: CPT

## 2018-07-02 PROCEDURE — 82746 ASSAY OF FOLIC ACID SERUM: CPT

## 2018-07-02 PROCEDURE — 85007 BL SMEAR W/DIFF WBC COUNT: CPT

## 2018-07-02 PROCEDURE — 36415 COLL VENOUS BLD VENIPUNCTURE: CPT

## 2018-07-02 PROCEDURE — 83540 ASSAY OF IRON: CPT

## 2018-07-02 PROCEDURE — 84443 ASSAY THYROID STIM HORMONE: CPT

## 2018-07-02 PROCEDURE — 82306 VITAMIN D 25 HYDROXY: CPT

## 2018-07-02 PROCEDURE — 85027 COMPLETE CBC AUTOMATED: CPT

## 2018-07-02 PROCEDURE — 80053 COMPREHEN METABOLIC PANEL: CPT

## 2018-07-02 PROCEDURE — 82607 VITAMIN B-12: CPT

## 2018-07-02 PROCEDURE — 83550 IRON BINDING TEST: CPT

## 2018-07-02 PROCEDURE — 80061 LIPID PANEL: CPT

## 2018-07-10 ENCOUNTER — TELEPHONE (OUTPATIENT)
Dept: MEDICAL GROUP | Age: 33
End: 2018-07-10

## 2018-07-10 DIAGNOSIS — E03.1 CONGENITAL HYPOTHYROIDISM WITHOUT GOITER: ICD-10-CM

## 2018-07-10 RX ORDER — LEVOTHYROXINE SODIUM 0.12 MG/1
250 TABLET ORAL
Qty: 180 TAB | Refills: 4 | Status: SHIPPED | OUTPATIENT
Start: 2018-07-10 | End: 2020-08-05

## 2018-08-09 ENCOUNTER — APPOINTMENT (OUTPATIENT)
Dept: MEDICAL GROUP | Age: 33
End: 2018-08-09
Payer: COMMERCIAL

## 2019-07-16 ENCOUNTER — OFFICE VISIT (OUTPATIENT)
Dept: MEDICAL GROUP | Age: 34
End: 2019-07-16
Payer: COMMERCIAL

## 2019-07-16 VITALS
TEMPERATURE: 97.9 F | SYSTOLIC BLOOD PRESSURE: 114 MMHG | WEIGHT: 143 LBS | HEART RATE: 98 BPM | OXYGEN SATURATION: 100 % | DIASTOLIC BLOOD PRESSURE: 86 MMHG | HEIGHT: 60 IN | BODY MASS INDEX: 28.07 KG/M2

## 2019-07-16 DIAGNOSIS — J02.9 SORE THROAT: ICD-10-CM

## 2019-07-16 DIAGNOSIS — F51.01 PRIMARY INSOMNIA: ICD-10-CM

## 2019-07-16 DIAGNOSIS — J02.9 ACUTE PHARYNGITIS, UNSPECIFIED ETIOLOGY: ICD-10-CM

## 2019-07-16 LAB
INT CON NEG: NEGATIVE
INT CON POS: POSITIVE
S PYO AG THROAT QL: NEGATIVE

## 2019-07-16 PROCEDURE — 99214 OFFICE O/P EST MOD 30 MIN: CPT | Performed by: INTERNAL MEDICINE

## 2019-07-16 PROCEDURE — 87880 STREP A ASSAY W/OPTIC: CPT | Performed by: INTERNAL MEDICINE

## 2019-07-16 RX ORDER — AZITHROMYCIN 250 MG/1
TABLET, FILM COATED ORAL
Qty: 6 TAB | Refills: 1 | Status: SHIPPED | OUTPATIENT
Start: 2019-07-16 | End: 2019-09-06

## 2019-07-16 RX ORDER — MIRTAZAPINE 15 MG/1
15 TABLET, FILM COATED ORAL NIGHTLY
COMMUNITY
End: 2019-09-06

## 2019-07-16 ASSESSMENT — ENCOUNTER SYMPTOMS
EYES NEGATIVE: 1
SORE THROAT: 1
PSYCHIATRIC NEGATIVE: 1
CONSTITUTIONAL NEGATIVE: 1
GASTROINTESTINAL NEGATIVE: 1
RESPIRATORY NEGATIVE: 1
NEUROLOGICAL NEGATIVE: 1
SINUS PAIN: 1
MUSCULOSKELETAL NEGATIVE: 1
CARDIOVASCULAR NEGATIVE: 1

## 2019-07-16 NOTE — PROGRESS NOTES
Subjective:      Isabel Curry is a 34 y.o. female who presents with Pharyngitis and Otalgia        HPI    The patient is here for an acute complaint of a moderate sore throat that began yesterday. She notes associated symptoms of swollen glands, nasal congestion, sinus pressure, and ear pain. Rapid strep done in office is negative.       Patient Active Problem List   Diagnosis   • Congenital hypothyroidism without goiter   • Iron deficiency anemia due to chronic blood loss   • Attention deficit hyperactivity disorder (ADHD), combined type- dr bethanie gray, psychiatrist   • Vitamin D deficiency   • S/P gastric bypass       Outpatient Medications Prior to Visit   Medication Sig Dispense Refill   • levothyroxine (SYNTHROID) 125 MCG Tab Take 2 Tabs by mouth Every morning on an empty stomach. 180 Tab 4   • ADDERALL XR, 20MG, 20 MG per XR capsule      • amoxicillin-clavulanate (AUGMENTIN) 875-125 MG Tab Take 1 Tab by mouth 2 times a day. (Patient not taking: Reported on 7/16/2019) 20 Tab 1   • ferrous sulfate (FEOSOL) 220 (44 Fe) MG/5ML Elixir Take 5 mL by mouth 2 times a day. (Patient not taking: Reported on 7/16/2019) 1 Bottle 11     No facility-administered medications prior to visit.         No Known Allergies    Review of Systems   Constitutional: Negative.    HENT: Positive for congestion, ear pain, sinus pain and sore throat.    Eyes: Negative.    Respiratory: Negative.    Cardiovascular: Negative.    Gastrointestinal: Negative.    Genitourinary: Negative.    Musculoskeletal: Negative.    Skin: Negative.    Neurological: Negative.    Endo/Heme/Allergies:        Swollen lymph nodes   Psychiatric/Behavioral: Negative.    All other systems reviewed and are negative.       Objective:     /86 (BP Location: Left arm, Patient Position: Sitting, BP Cuff Size: Adult)   Pulse 98   Temp 36.6 °C (97.9 °F) (Temporal)   Ht 1.524 m (5')   Wt 64.9 kg (143 lb)   LMP 07/09/2019 (Within Days)   SpO2 100%   BMI  27.93 kg/m²     Physical Exam   Constitutional: Oriented to person, place, and time. Appears well-developed and well-nourished. No distress.   Head: Normocephalic and atraumatic.   Right Ear: External ear normal.   Left Ear: External ear normal.   Nose: Nose normal.   Mouth/Throat: Oropharynx is clear and moist. No oropharyngeal exudate.   Eyes: Pupils are equal, round, and reactive to light. Conjunctivae and EOM are normal. Right eye exhibits no discharge. Left eye exhibits no discharge. No scleral icterus.   Neck: Normal range of motion. Neck supple. No JVD present. No tracheal deviation present. No thyromegaly present.   Cardiovascular: Normal rate, regular rhythm, normal heart sounds and intact distal pulses.  Exam reveals no gallop and no friction rub.    No murmur heard.  Pulmonary/Chest: Effort normal. No stridor. No respiratory distress. No wheezing or rales. No tenderness.   Abdominal: Soft. Bowel sounds are normal. No distension and no mass. There is no tenderness. There is no rebound and no guarding. No hernia.   Musculoskeletal: Normal range of motion No edema or tenderness.   Lymphadenopathy: Cervical adenopathy.   Neurological: Alert and oriented to person, place, and time. Normal reflexes. Normal reflexes. No cranial nerve deficit. Normal muscle tone. Coordination normal.   Skin: Skin is warm and dry. No rash noted. Not diaphoretic. No erythema. No pallor.   Psychiatric: Normal mood and affect. Behavior is normal. Judgment and thought content normal.   Nursing note and vitals reviewed.      No results found for: HBA1C  Lab Results   Component Value Date/Time    SODIUM 134 (L) 07/02/2018 12:00 PM    POTASSIUM 3.8 07/02/2018 12:00 PM    CHLORIDE 101 07/02/2018 12:00 PM    CO2 24 07/02/2018 12:00 PM    GLUCOSE 91 07/02/2018 12:00 PM    BUN 20 07/02/2018 12:00 PM    CREATININE 0.96 07/02/2018 12:00 PM    ALKPHOSPHAT 70 07/02/2018 12:00 PM    ASTSGOT 28 07/02/2018 12:00 PM    ALTSGPT 17 07/02/2018 12:00 PM     TBILIRUBIN 0.5 07/02/2018 12:00 PM     No results found for: INR  Lab Results   Component Value Date/Time    CHOLSTRLTOT 244 (H) 07/02/2018 12:00 PM     (H) 07/02/2018 12:00 PM    HDL 87 07/02/2018 12:00 PM    TRIGLYCERIDE 125 07/02/2018 12:00 PM       No results found for: TESTOSTERONE  No results found for: TSH  Lab Results   Component Value Date/Time    FREET4 0.38 (L) 11/02/2017 12:05 PM    FREET4 0.50 (L) 01/07/2017 08:45 AM     No results found for: URICACID  No components found for: VITB12  Lab Results   Component Value Date/Time    25HYDROXY 26 (L) 07/02/2018 12:00 PM          Assessment/Plan:     1. Sore throat  See #2. Rapid strep is negative.   - POCT Rapid Strep A    2. Acute pharyngitis, unspecified etiology  The patient's rapid strep test is negative. Her physical exam does not show signs of strep throat. I advised her to gargle salt water, drink plenty of fluids, rest, and take tylenol for pain. I have prescribed her Remeron and Zithromax in case her symptoms worsen or do not resolve.   - mirtazapine (REMERON) 15 MG Tab; Take 15 mg by mouth every evening.  - azithromycin (ZITHROMAX) 250 MG Tab; Take 2 tabs today then 1 per day for 4 days  Dispense: 6 Tab; Refill: 1         30 minute face-to-face encounter took place today.  More than half of this time was spent in the coordination of care of the above problems, as well as counseling.     Rita TOLBERT ([employer]rib[employer]), am scribing for, and in the presence of, Alfred Gonzalez M.D..    Electronically signed by: Rita Sanchez (Den), 7/16/2019    Alfred TOLBERT M.D., personally performed the services described in this documentation, as scribed by Rita Sanchez in my presence, and it is both accurate and complete.

## 2019-08-28 ENCOUNTER — HOSPITAL ENCOUNTER (OUTPATIENT)
Facility: MEDICAL CENTER | Age: 34
End: 2019-08-28
Attending: INTERNAL MEDICINE
Payer: COMMERCIAL

## 2019-08-28 ENCOUNTER — HOSPITAL ENCOUNTER (EMERGENCY)
Facility: MEDICAL CENTER | Age: 34
End: 2019-08-28
Attending: EMERGENCY MEDICINE
Payer: COMMERCIAL

## 2019-08-28 ENCOUNTER — HOSPITAL ENCOUNTER (OUTPATIENT)
Dept: LAB | Facility: MEDICAL CENTER | Age: 34
End: 2019-08-28
Attending: INTERNAL MEDICINE
Payer: COMMERCIAL

## 2019-08-28 ENCOUNTER — OFFICE VISIT (OUTPATIENT)
Dept: MEDICAL GROUP | Age: 34
End: 2019-08-28
Payer: COMMERCIAL

## 2019-08-28 VITALS
WEIGHT: 146.83 LBS | SYSTOLIC BLOOD PRESSURE: 112 MMHG | HEART RATE: 90 BPM | OXYGEN SATURATION: 99 % | TEMPERATURE: 97.3 F | DIASTOLIC BLOOD PRESSURE: 64 MMHG | HEIGHT: 60 IN | BODY MASS INDEX: 28.83 KG/M2 | RESPIRATION RATE: 18 BRPM

## 2019-08-28 VITALS
SYSTOLIC BLOOD PRESSURE: 98 MMHG | HEIGHT: 60 IN | WEIGHT: 145.6 LBS | TEMPERATURE: 98.2 F | OXYGEN SATURATION: 100 % | HEART RATE: 82 BPM | DIASTOLIC BLOOD PRESSURE: 68 MMHG | BODY MASS INDEX: 28.58 KG/M2

## 2019-08-28 DIAGNOSIS — N30.01 ACUTE CYSTITIS WITH HEMATURIA: ICD-10-CM

## 2019-08-28 DIAGNOSIS — R53.83 FATIGUE, UNSPECIFIED TYPE: ICD-10-CM

## 2019-08-28 DIAGNOSIS — R73.9 HYPERGLYCEMIA: ICD-10-CM

## 2019-08-28 DIAGNOSIS — E78.2 MIXED HYPERLIPIDEMIA: ICD-10-CM

## 2019-08-28 DIAGNOSIS — E55.9 VITAMIN D DEFICIENCY: ICD-10-CM

## 2019-08-28 DIAGNOSIS — D50.0 IRON DEFICIENCY ANEMIA DUE TO CHRONIC BLOOD LOSS: ICD-10-CM

## 2019-08-28 DIAGNOSIS — E03.1 CONGENITAL HYPOTHYROIDISM WITHOUT GOITER: ICD-10-CM

## 2019-08-28 DIAGNOSIS — R30.9 PAINFUL URINATION: ICD-10-CM

## 2019-08-28 DIAGNOSIS — E53.8 VITAMIN B 12 DEFICIENCY: ICD-10-CM

## 2019-08-28 DIAGNOSIS — Z98.84 S/P GASTRIC BYPASS: ICD-10-CM

## 2019-08-28 LAB
25(OH)D3 SERPL-MCNC: 18 NG/ML (ref 30–100)
ABO + RH BLD: NORMAL
ABO GROUP BLD: NORMAL
ALBUMIN SERPL BCP-MCNC: 3.9 G/DL (ref 3.2–4.9)
ALBUMIN/GLOB SERPL: 1.2 G/DL
ALP SERPL-CCNC: 67 U/L (ref 30–99)
ALT SERPL-CCNC: 12 U/L (ref 2–50)
ANION GAP SERPL CALC-SCNC: 13 MMOL/L (ref 0–11.9)
ANION GAP SERPL CALC-SCNC: 7 MMOL/L (ref 0–11.9)
ANISOCYTOSIS BLD QL SMEAR: ABNORMAL
APPEARANCE UR: ABNORMAL
APPEARANCE UR: ABNORMAL
AST SERPL-CCNC: 20 U/L (ref 12–45)
BACTERIA #/AREA URNS HPF: ABNORMAL /HPF
BARCODED ABORH UBTYP: 5100
BARCODED PRD CODE UBPRD: NORMAL
BARCODED UNIT NUM UBUNT: NORMAL
BASOPHILS # BLD AUTO: 1.4 % (ref 0–1.8)
BASOPHILS # BLD AUTO: 1.5 % (ref 0–1.8)
BASOPHILS # BLD: 0.09 K/UL (ref 0–0.12)
BASOPHILS # BLD: 0.11 K/UL (ref 0–0.12)
BILIRUB SERPL-MCNC: 0.3 MG/DL (ref 0.1–1.5)
BILIRUB UR QL STRIP.AUTO: NEGATIVE
BILIRUB UR STRIP-MCNC: NEGATIVE MG/DL
BLD GP AB SCN SERPL QL: NORMAL
BUN SERPL-MCNC: 17 MG/DL (ref 8–22)
BUN SERPL-MCNC: 21 MG/DL (ref 8–22)
CALCIUM SERPL-MCNC: 9.3 MG/DL (ref 8.4–10.2)
CALCIUM SERPL-MCNC: 9.3 MG/DL (ref 8.5–10.5)
CHLORIDE SERPL-SCNC: 102 MMOL/L (ref 96–112)
CHLORIDE SERPL-SCNC: 106 MMOL/L (ref 96–112)
CHOLEST SERPL-MCNC: 175 MG/DL (ref 100–199)
CO2 SERPL-SCNC: 23 MMOL/L (ref 20–33)
CO2 SERPL-SCNC: 25 MMOL/L (ref 20–33)
COLOR UR AUTO: ABNORMAL
COLOR UR: YELLOW
COMMENT 1642: NORMAL
COMPONENT R 8504R: NORMAL
CREAT SERPL-MCNC: 0.73 MG/DL (ref 0.5–1.4)
CREAT SERPL-MCNC: 0.74 MG/DL (ref 0.5–1.4)
DACRYOCYTES BLD QL SMEAR: NORMAL
EOSINOPHIL # BLD AUTO: 0.1 K/UL (ref 0–0.51)
EOSINOPHIL # BLD AUTO: 0.14 K/UL (ref 0–0.51)
EOSINOPHIL NFR BLD: 1.3 % (ref 0–6.9)
EOSINOPHIL NFR BLD: 2.2 % (ref 0–6.9)
EPI CELLS #/AREA URNS HPF: ABNORMAL /HPF
ERYTHROCYTE [DISTWIDTH] IN BLOOD BY AUTOMATED COUNT: 46.5 FL (ref 35.9–50)
ERYTHROCYTE [DISTWIDTH] IN BLOOD BY AUTOMATED COUNT: 47.7 FL (ref 35.9–50)
EST. AVERAGE GLUCOSE BLD GHB EST-MCNC: 114 MG/DL
FASTING STATUS PATIENT QL REPORTED: NORMAL
FERRITIN SERPL-MCNC: 2.6 NG/ML (ref 10–291)
FOLATE SERPL-MCNC: 13.7 NG/ML
GIANT PLATELETS BLD QL SMEAR: NORMAL
GLOBULIN SER CALC-MCNC: 3.3 G/DL (ref 1.9–3.5)
GLUCOSE SERPL-MCNC: 68 MG/DL (ref 65–99)
GLUCOSE SERPL-MCNC: 93 MG/DL (ref 65–99)
GLUCOSE UR STRIP.AUTO-MCNC: NEGATIVE MG/DL
GLUCOSE UR STRIP.AUTO-MCNC: NEGATIVE MG/DL
HBA1C MFR BLD: 5.6 % (ref 0–5.6)
HCG SERPL QL: NEGATIVE
HCT VFR BLD AUTO: 27 % (ref 37–47)
HCT VFR BLD AUTO: 27.3 % (ref 37–47)
HDLC SERPL-MCNC: 74 MG/DL
HGB BLD-MCNC: 6.9 G/DL (ref 12–16)
HGB BLD-MCNC: 7.1 G/DL (ref 12–16)
HYPOCHROMIA BLD QL SMEAR: ABNORMAL
IMM GRANULOCYTES # BLD AUTO: 0.01 K/UL (ref 0–0.11)
IMM GRANULOCYTES # BLD AUTO: 0.02 K/UL (ref 0–0.11)
IMM GRANULOCYTES NFR BLD AUTO: 0.1 % (ref 0–0.9)
IMM GRANULOCYTES NFR BLD AUTO: 0.3 % (ref 0–0.9)
IRON SATN MFR SERPL: ABNORMAL % (ref 15–55)
IRON SERPL-MCNC: <10 UG/DL (ref 40–170)
KETONES UR STRIP.AUTO-MCNC: NEGATIVE MG/DL
KETONES UR STRIP.AUTO-MCNC: NEGATIVE MG/DL
LDLC SERPL CALC-MCNC: 83 MG/DL
LEUKOCYTE ESTERASE UR QL STRIP.AUTO: ABNORMAL
LEUKOCYTE ESTERASE UR QL STRIP.AUTO: ABNORMAL
LYMPHOCYTES # BLD AUTO: 2.39 K/UL (ref 1–4.8)
LYMPHOCYTES # BLD AUTO: 2.74 K/UL (ref 1–4.8)
LYMPHOCYTES NFR BLD: 36.3 % (ref 22–41)
LYMPHOCYTES NFR BLD: 36.7 % (ref 22–41)
MCH RBC QN AUTO: 16.9 PG (ref 27–33)
MCH RBC QN AUTO: 16.9 PG (ref 27–33)
MCHC RBC AUTO-ENTMCNC: 25.6 G/DL (ref 33.6–35)
MCHC RBC AUTO-ENTMCNC: 26 G/DL (ref 33.6–35)
MCV RBC AUTO: 64.8 FL (ref 81.4–97.8)
MCV RBC AUTO: 66.2 FL (ref 81.4–97.8)
MICRO URNS: ABNORMAL
MICROCYTES BLD QL SMEAR: ABNORMAL
MONOCYTES # BLD AUTO: 0.48 K/UL (ref 0–0.85)
MONOCYTES # BLD AUTO: 0.61 K/UL (ref 0–0.85)
MONOCYTES NFR BLD AUTO: 6.4 % (ref 0–13.4)
MONOCYTES NFR BLD AUTO: 9.4 % (ref 0–13.4)
MORPHOLOGY BLD-IMP: NORMAL
NEUTROPHILS # BLD AUTO: 3.26 K/UL (ref 2–7.15)
NEUTROPHILS # BLD AUTO: 4.11 K/UL (ref 2–7.15)
NEUTROPHILS NFR BLD: 50 % (ref 44–72)
NEUTROPHILS NFR BLD: 54.4 % (ref 44–72)
NITRITE UR QL STRIP.AUTO: NEGATIVE
NITRITE UR QL STRIP.AUTO: NEGATIVE
NRBC # BLD AUTO: 0 K/UL
NRBC # BLD AUTO: 0 K/UL
NRBC BLD-RTO: 0 /100 WBC
NRBC BLD-RTO: 0 /100 WBC
PH UR STRIP.AUTO: 5.5 [PH] (ref 5–8)
PH UR STRIP.AUTO: 6.5 [PH] (ref 5–8)
PLATELET # BLD AUTO: 506 K/UL (ref 164–446)
PLATELET # BLD AUTO: 572 K/UL (ref 164–446)
PLATELET BLD QL SMEAR: NORMAL
PMV BLD AUTO: 10.9 FL (ref 9–12.9)
PMV BLD AUTO: 11 FL (ref 9–12.9)
POIKILOCYTOSIS BLD QL SMEAR: NORMAL
POLYCHROMASIA BLD QL SMEAR: NORMAL
POTASSIUM SERPL-SCNC: 3.7 MMOL/L (ref 3.6–5.5)
POTASSIUM SERPL-SCNC: 4.2 MMOL/L (ref 3.6–5.5)
PRODUCT TYPE UPROD: NORMAL
PROT SERPL-MCNC: 7.2 G/DL (ref 6–8.2)
PROT UR QL STRIP: 100 MG/DL
PROT UR QL STRIP: NEGATIVE MG/DL
RBC # BLD AUTO: 4.08 M/UL (ref 4.2–5.4)
RBC # BLD AUTO: 4.21 M/UL (ref 4.2–5.4)
RBC # URNS HPF: ABNORMAL /HPF
RBC BLD AUTO: PRESENT
RBC UR QL AUTO: ABNORMAL
RBC UR QL AUTO: NEGATIVE
RH BLD: NORMAL
SODIUM SERPL-SCNC: 138 MMOL/L (ref 135–145)
SODIUM SERPL-SCNC: 138 MMOL/L (ref 135–145)
SP GR UR STRIP.AUTO: 1.02
SP GR UR STRIP.AUTO: <=1.005
STOMATOCYTES BLD QL SMEAR: NORMAL
TIBC SERPL-MCNC: 594 UG/DL (ref 250–450)
TRIGL SERPL-MCNC: 90 MG/DL (ref 0–149)
TSH SERPL DL<=0.005 MIU/L-ACNC: 0.21 UIU/ML (ref 0.38–5.33)
UNIT STATUS USTAT: NORMAL
UROBILINOGEN UR STRIP-MCNC: 1 MG/DL
VIT B12 SERPL-MCNC: 117 PG/ML (ref 211–911)
WBC # BLD AUTO: 6.5 K/UL (ref 4.8–10.8)
WBC # BLD AUTO: 7.6 K/UL (ref 4.8–10.8)
WBC #/AREA URNS HPF: ABNORMAL /HPF

## 2019-08-28 PROCEDURE — 87086 URINE CULTURE/COLONY COUNT: CPT

## 2019-08-28 PROCEDURE — 85025 COMPLETE CBC W/AUTO DIFF WBC: CPT | Mod: 91

## 2019-08-28 PROCEDURE — 86850 RBC ANTIBODY SCREEN: CPT

## 2019-08-28 PROCEDURE — 81002 URINALYSIS NONAUTO W/O SCOPE: CPT | Performed by: INTERNAL MEDICINE

## 2019-08-28 PROCEDURE — 83540 ASSAY OF IRON: CPT

## 2019-08-28 PROCEDURE — 86900 BLOOD TYPING SEROLOGIC ABO: CPT

## 2019-08-28 PROCEDURE — 80053 COMPREHEN METABOLIC PANEL: CPT

## 2019-08-28 PROCEDURE — 700102 HCHG RX REV CODE 250 W/ 637 OVERRIDE(OP)

## 2019-08-28 PROCEDURE — 82306 VITAMIN D 25 HYDROXY: CPT

## 2019-08-28 PROCEDURE — 83036 HEMOGLOBIN GLYCOSYLATED A1C: CPT

## 2019-08-28 PROCEDURE — 700102 HCHG RX REV CODE 250 W/ 637 OVERRIDE(OP): Performed by: EMERGENCY MEDICINE

## 2019-08-28 PROCEDURE — 82728 ASSAY OF FERRITIN: CPT

## 2019-08-28 PROCEDURE — 87086 URINE CULTURE/COLONY COUNT: CPT | Mod: 91

## 2019-08-28 PROCEDURE — 87077 CULTURE AEROBIC IDENTIFY: CPT

## 2019-08-28 PROCEDURE — 84703 CHORIONIC GONADOTROPIN ASSAY: CPT

## 2019-08-28 PROCEDURE — 82607 VITAMIN B-12: CPT

## 2019-08-28 PROCEDURE — 36415 COLL VENOUS BLD VENIPUNCTURE: CPT

## 2019-08-28 PROCEDURE — P9016 RBC LEUKOCYTES REDUCED: HCPCS

## 2019-08-28 PROCEDURE — 81001 URINALYSIS AUTO W/SCOPE: CPT

## 2019-08-28 PROCEDURE — 86901 BLOOD TYPING SEROLOGIC RH(D): CPT

## 2019-08-28 PROCEDURE — 80048 BASIC METABOLIC PNL TOTAL CA: CPT

## 2019-08-28 PROCEDURE — 99285 EMERGENCY DEPT VISIT HI MDM: CPT

## 2019-08-28 PROCEDURE — A9270 NON-COVERED ITEM OR SERVICE: HCPCS

## 2019-08-28 PROCEDURE — 83550 IRON BINDING TEST: CPT

## 2019-08-28 PROCEDURE — 87186 SC STD MICRODIL/AGAR DIL: CPT | Mod: 91

## 2019-08-28 PROCEDURE — 82746 ASSAY OF FOLIC ACID SERUM: CPT

## 2019-08-28 PROCEDURE — 86923 COMPATIBILITY TEST ELECTRIC: CPT

## 2019-08-28 PROCEDURE — 700105 HCHG RX REV CODE 258: Performed by: EMERGENCY MEDICINE

## 2019-08-28 PROCEDURE — 80061 LIPID PANEL: CPT

## 2019-08-28 PROCEDURE — 99215 OFFICE O/P EST HI 40 MIN: CPT | Performed by: INTERNAL MEDICINE

## 2019-08-28 PROCEDURE — A9270 NON-COVERED ITEM OR SERVICE: HCPCS | Performed by: EMERGENCY MEDICINE

## 2019-08-28 PROCEDURE — 82272 OCCULT BLD FECES 1-3 TESTS: CPT

## 2019-08-28 PROCEDURE — 84443 ASSAY THYROID STIM HORMONE: CPT

## 2019-08-28 PROCEDURE — 87186 SC STD MICRODIL/AGAR DIL: CPT

## 2019-08-28 PROCEDURE — 85025 COMPLETE CBC W/AUTO DIFF WBC: CPT

## 2019-08-28 PROCEDURE — 87077 CULTURE AEROBIC IDENTIFY: CPT | Mod: 91

## 2019-08-28 PROCEDURE — 36430 TRANSFUSION BLD/BLD COMPNT: CPT

## 2019-08-28 RX ORDER — PHENAZOPYRIDINE HYDROCHLORIDE 200 MG/1
200 TABLET, FILM COATED ORAL ONCE
Status: COMPLETED | OUTPATIENT
Start: 2019-08-28 | End: 2019-08-28

## 2019-08-28 RX ORDER — FERROUS SULFATE 325(65) MG
325 TABLET ORAL DAILY
Qty: 30 TAB | Refills: 0 | Status: SHIPPED | OUTPATIENT
Start: 2019-08-28 | End: 2019-09-06

## 2019-08-28 RX ORDER — PHENAZOPYRIDINE HYDROCHLORIDE 200 MG/1
200 TABLET, FILM COATED ORAL 3 TIMES DAILY
Qty: 6 TAB | Refills: 0 | Status: SHIPPED | OUTPATIENT
Start: 2019-08-28 | End: 2019-08-30

## 2019-08-28 RX ORDER — CEPHALEXIN 250 MG/1
500 CAPSULE ORAL ONCE
Status: COMPLETED | OUTPATIENT
Start: 2019-08-28 | End: 2019-08-28

## 2019-08-28 RX ORDER — CEPHALEXIN 250 MG/1
CAPSULE ORAL
Status: COMPLETED
Start: 2019-08-28 | End: 2019-08-28

## 2019-08-28 RX ORDER — SODIUM CHLORIDE 9 MG/ML
INJECTION, SOLUTION INTRAVENOUS CONTINUOUS
Status: DISCONTINUED | OUTPATIENT
Start: 2019-08-28 | End: 2019-08-29 | Stop reason: HOSPADM

## 2019-08-28 RX ORDER — CEPHALEXIN 500 MG/1
500 CAPSULE ORAL 3 TIMES DAILY
Qty: 21 CAP | Refills: 0 | Status: SHIPPED | OUTPATIENT
Start: 2019-08-28 | End: 2019-09-04

## 2019-08-28 RX ORDER — SULFAMETHOXAZOLE AND TRIMETHOPRIM 800; 160 MG/1; MG/1
1 TABLET ORAL 2 TIMES DAILY
Qty: 20 TAB | Refills: 0 | Status: SHIPPED | OUTPATIENT
Start: 2019-08-28 | End: 2019-09-06

## 2019-08-28 RX ADMIN — CEPHALEXIN 500 MG: 250 CAPSULE ORAL at 21:17

## 2019-08-28 RX ADMIN — PHENAZOPYRIDINE 200 MG: 200 TABLET ORAL at 20:10

## 2019-08-28 RX ADMIN — SODIUM CHLORIDE: 9 INJECTION, SOLUTION INTRAVENOUS at 21:18

## 2019-08-28 ASSESSMENT — ENCOUNTER SYMPTOMS
NEUROLOGICAL NEGATIVE: 1
CARDIOVASCULAR NEGATIVE: 1
MUSCULOSKELETAL NEGATIVE: 1
EYES NEGATIVE: 1
GASTROINTESTINAL NEGATIVE: 1
PSYCHIATRIC NEGATIVE: 1
RESPIRATORY NEGATIVE: 1
FEVER: 1

## 2019-08-28 ASSESSMENT — PATIENT HEALTH QUESTIONNAIRE - PHQ9: CLINICAL INTERPRETATION OF PHQ2 SCORE: 0

## 2019-08-28 NOTE — PROGRESS NOTES
Subjective:      Isabel Curry is a 34 y.o. female who presents with Cystitis (concerned it went up to kidneys); Fatigue (x 3 weeks); and Blood Sugar Problem (pt stated her sugar has been dropping more than usual)        HPI    The patient is here for followup of chronic medical problems listed below. The patient is compliant with medications and having no side effects from them. Denies chest pain, abdominal pain, dyspnea, myalgias, or cough.    Patient complains of dysuria, hematuria, urinary frequency, and urgency for 2 weeks. Patient states she though it went away for a few days, but it returned. She reports associated fatigue, lower back pain, and pelvic pain. She notes some low grade fevers that are intermittent.    Patient reports history of hypothyroidism. She notes that she has been consistently taking her Levothyroxine 250mg but has been very fatigued.    Patient Active Problem List   Diagnosis   • Congenital hypothyroidism without goiter   • Iron deficiency anemia due to chronic blood loss   • Attention deficit hyperactivity disorder (ADHD), combined type- dr bethanie gray, psychiatrist   • Vitamin D deficiency   • S/P gastric bypass   • Primary insomnia   • Hyperglycemia   • Fatigue   • Vitamin B 12 deficiency       Outpatient Medications Prior to Visit   Medication Sig Dispense Refill   • levothyroxine (SYNTHROID) 125 MCG Tab Take 2 Tabs by mouth Every morning on an empty stomach. 180 Tab 4   • mirtazapine (REMERON) 15 MG Tab Take 15 mg by mouth every evening.     • azithromycin (ZITHROMAX) 250 MG Tab Take 2 tabs today then 1 per day for 4 days (Patient not taking: Reported on 8/28/2019) 6 Tab 1   • ADDERALL XR, 20MG, 20 MG per XR capsule        No facility-administered medications prior to visit.         No Known Allergies    Review of Systems   Constitutional: Positive for fever (low grade, intermittent) and malaise/fatigue.   HENT: Negative.    Eyes: Negative.    Respiratory: Negative.     Cardiovascular: Negative.    Gastrointestinal: Negative.    Genitourinary: Positive for dysuria, frequency and urgency.   Musculoskeletal: Negative.    Skin: Negative.    Neurological: Negative.    Endo/Heme/Allergies: Negative.    Psychiatric/Behavioral: Negative.    All other systems reviewed and are negative.           Objective:     BP (!) 98/68 (BP Location: Right arm, Patient Position: Sitting, BP Cuff Size: Adult)   Pulse 82   Temp 36.8 °C (98.2 °F) (Temporal)   Ht 1.524 m (5')   Wt 66 kg (145 lb 9.6 oz)   LMP 08/27/2019 (Approximate)   SpO2 100%   Breastfeeding? No   BMI 28.44 kg/m²      Physical Exam   Constitutional: Oriented to person, place, and time. Appears well-developed and well-nourished. No distress.   Head: Normocephalic and atraumatic.   Right Ear: External ear normal.   Left Ear: External ear normal.   Nose: Nose normal.   Mouth/Throat: Oropharynx is clear and moist. No oropharyngeal exudate.   Eyes: Pupils are equal, round, and reactive to light. Conjunctivae and EOM are normal. Right eye exhibits no discharge. Left eye exhibits no discharge. No scleral icterus.   Neck: Normal range of motion. Neck supple. No JVD present. No tracheal deviation present. No thyromegaly present.   Cardiovascular: Normal rate, regular rhythm, normal heart sounds and intact distal pulses.  Exam reveals no gallop and no friction rub.    No murmur heard.  Pulmonary/Chest: Effort normal. No stridor. No respiratory distress. No wheezing or rales. No tenderness.   Abdominal: Soft. Bowel sounds are normal. No distension and no mass. There is no tenderness. There is no rebound and no guarding. No hernia.   Musculoskeletal: Normal range of motion No edema or tenderness.   Lymphadenopathy: No cervical adenopathy.   Neurological: Alert and oriented to person, place, and time. Normal reflexes. Normal reflexes. No cranial nerve deficit. Normal muscle tone. Coordination normal.   Skin: Skin is warm and dry. No rash  noted. Not diaphoretic. No erythema. No pallor.   Psychiatric: Normal mood and affect. Behavior is normal. Judgment and thought content normal.   Nursing note and vitals reviewed.      No results found for: HBA1C  Lab Results   Component Value Date/Time    SODIUM 134 (L) 07/02/2018 12:00 PM    POTASSIUM 3.8 07/02/2018 12:00 PM    CHLORIDE 101 07/02/2018 12:00 PM    CO2 24 07/02/2018 12:00 PM    GLUCOSE 91 07/02/2018 12:00 PM    BUN 20 07/02/2018 12:00 PM    CREATININE 0.96 07/02/2018 12:00 PM    ALKPHOSPHAT 70 07/02/2018 12:00 PM    ASTSGOT 28 07/02/2018 12:00 PM    ALTSGPT 17 07/02/2018 12:00 PM    TBILIRUBIN 0.5 07/02/2018 12:00 PM     No results found for: INR  Lab Results   Component Value Date/Time    CHOLSTRLTOT 244 (H) 07/02/2018 12:00 PM     (H) 07/02/2018 12:00 PM    HDL 87 07/02/2018 12:00 PM    TRIGLYCERIDE 125 07/02/2018 12:00 PM       No results found for: TESTOSTERONE  No results found for: TSH  Lab Results   Component Value Date/Time    FREET4 0.38 (L) 11/02/2017 12:05 PM    FREET4 0.50 (L) 01/07/2017 08:45 AM     No results found for: URICACID  No components found for: VITB12  Lab Results   Component Value Date/Time    25HYDROXY 26 (L) 07/02/2018 12:00 PM          Assessment/Plan:     1. Painful urination  Patient reports dysuria, hematuria, urinary urgency and frequency for two weeks. She notes some intermittent low grade fever. UA in office shows UTI. Plan to treat with:    - POCT Urinalysis  - sulfamethoxazole-trimethoprim (BACTRIM DS) 800-160 MG tablet; Take 1 Tab by mouth 2 times a day.  Dispense: 20 Tab; Refill: 0    2. Acute cystitis with hematuria  See above.    - sulfamethoxazole-trimethoprim (BACTRIM DS) 800-160 MG tablet; Take 1 Tab by mouth 2 times a day.  Dispense: 20 Tab; Refill: 0  - URINE CULTURE(NEW); Future    3. Fatigue, unspecified type  Patient complains of chronic fatigue worsening over the past few weeks. She attributed it to starting school and her kids being more busy,  but states it is still unusual for her.    4. Congenital hypothyroidism without goiter  Patient has been compliant with her Levothyroxine 250mcg, but states she has been feeling fatigued. Plan to evaluate with:    - TSH; Future  - Comp Metabolic Panel; Future  - Lipid Profile; Future    5. Iron deficiency anemia due to chronic blood loss  Patient is status post gastric bypass. She states she has not been taking her B12 or iron supplements. Plan to evaluate with:    - CBC WITH DIFFERENTIAL; Future  - VITAMIN B12; Future  - FOLATE; Future  - IRON/TOTAL IRON BIND; Future  - FERRITIN; Future    6. Vitamin D deficiency  Patient states she has not been taking her supplements status post gastric bypass. Plan to evaluate with:    - VITAMIN D,25 HYDROXY; Future    7. S/P gastric bypass  Patient states she has not been taking her supplements. Plan to evaluate B12 and iron levels with blood work and recheck in one week.    8. Hyperglycemia  Patient states that she frequently has periods of high and low blood sugars. She states she had to measure it when she lived in [place] and that her sugars were measured as low as 40 when she felt symptomatic.  Plan to evaluate with:    - HEMOGLOBIN A1C; Future    9. Mixed hyperlipidemia  Lipid panel completed on 7/2/18. Cholesterol 244; Triglycerides 125; HDL 87; . Plan to evaluate with:    - Comp Metabolic Panel; Future  - Lipid Profile; Future    10. Vitamin B 12 deficiency  Patient is status post gastric bypass. She has not been taking her B12 supplements.       40 minute face-to-face encounter took place today.  More than half of this time was spent in the coordination of care of the above problems, as well as counseling.     Bassam TOLBERT (Den), am scribing for, and in the presence of, Alfred Gonzalez M.D..    Electronically signed by: Bassam Molina (Den), 8/28/2019    Alfred TOLBERT M.D., personally performed the services described in this  documentation, as scribed by Bassam Molina in my presence, and it is both accurate and complete.       Addendum: Patient's hemoglobin count came back at 6.  Patient called and advised to go the emergency room as soon as possible for further evaluation of her severe anemia which may require blood transfusion.  This would explain her fatigue.

## 2019-08-28 NOTE — LETTER
8/31/2019               Isabel Curry  [address] [facility] [address]        Dear Isabel (MR#[id number])    This letter is sent in regards to your, recent visit to the [facility] Emergency Department on 8/28/2019.  During the visit, tests were performed to assist the physician in a medical diagnosis.  A review of those tests requires that we notify you of the following:    Your urine culture was POSITIVE for bacterias called Escherichia coli and Klebsiella pneumoniae. The antibiotic prescribed for you (cephalexin) should be active to treat these bacterias. IT IS IMPORTANT THAT YOU CONTINUE TAKING YOUR ANTIBIOTIC UNTIL IT IS FINISHED.      Please feel free to contact me at the number below if you have any questions or concerns. Thank you for your cooperation in the matter.    Sincerely,  ED Culture Follow-Up Staff  Froilan Soto, PharmD    [facility], Emergency Department  [address] [place] [id number]  [phone] (ED Culture Line)  [phone]

## 2019-08-29 NOTE — ED NOTES
Blood transfusion initiated. Pt educated of s/s of possible transfusion reaction including rash, sob, back pain, chest pain, fever, etc. Notified pt to inform RN if symptoms occur.

## 2019-08-29 NOTE — ED TRIAGE NOTES
Isabel Curry 34 y.o. female   Chief Complaint   Patient presents with   • Sent by MD     Pt sent from PCP after being told she has a low blood count   • Flank Pain     Evaluated by PCP for painful urination and frequeny and dx with a UTI; had flank pain for last month   • Painful Urination   • Fatigue     Pt c/o increased tiredness and fatigue     /75   Pulse 87   Temp 36.6 °C (97.8 °F) (Temporal)   Resp 18   Ht 1.524 m (5')   Wt 66.6 kg (146 lb 13.2 oz)   LMP 08/27/2019 (Approximate)   SpO2 100%   BMI 28.68 kg/m²     Pt returned to lobby and educated on triage process. Advised to notify RN with changes or concerns.

## 2019-08-29 NOTE — ED PROVIDER NOTES
ED Provider Note    ED Provider Note    Primary care provider: Alfred Gonzalez M.D.    CHIEF COMPLAINT  Chief Complaint   Patient presents with   • Sent by MD     Pt sent from PCP after being told she has a low blood count   • Flank Pain     Evaluated by PCP for painful urination and frequeny and dx with a UTI; had flank pain for last month   • Painful Urination   • Fatigue     Pt c/o increased tiredness and fatigue       HPI  Isabel Curry is a 34 y.o. female who presents with low blood count.  States she has blood in her urine and burning.  Seen by PCP who checks blood and she has a low blood count.  Admits to being dizzy.  No other bleeding.  Denies heavy menses or black/bloody stools.  States she has had a gastric bypass surgery and may have chronic blood loss.  No other bruising or bleeding.  States she does have fatigue and weight gain otherwise.  She has had hypothyroidism since birth and is on levothyroxine daily. She has had recent labs but no change in meds.  States she has not missed her medications.      REVIEW OF SYSTEMS  ROS  All other systems negative. See HPI for further details.       ALLERGIES  No Known Allergies    CURRENT MEDICATIONS  Home Medications    **Home medications have not yet been reviewed for this encounter**         PAST MEDICAL HISTORY   has a past medical history of ADD (attention deficit disorder), ADHD, Bipolar affective (HCC), Depression, VALENTE (generalized anxiety disorder), H/O gastric bypass, History of anemia, Hypothyroidism, and Major depression.    SURGICAL HISTORY   has a past surgical history that includes gastric bypass laparoscopic (2012); primary c section (7/16/2011); primary c section (8/3/2015); abdominoplasty (3/21/2016); and mammoplasty augmentation (Bilateral, 2/24/2017).    SOCIAL HISTORY  Social History     Tobacco Use   • Smoking status: Light Tobacco Smoker     Packs/day: 0.10     Years: 6.00     Pack years: 0.60     Types: Cigarettes     Last attempt  to quit: 2014     Years since quittin.7   • Smokeless tobacco: Never Used   • Tobacco comment: 1 cig per day   Substance Use Topics   • Alcohol use: No     Alcohol/week: 0.0 oz     Comment: 1-2 per month   • Drug use: No      Social History     Substance and Sexual Activity   Drug Use No       FAMILY HISTORY  Family History   Problem Relation Age of Onset   • Hypertension Mother    • Hyperlipidemia Mother    • Cancer Mother         breast   • Psychiatric Illness Mother         Depression   • Diabetes Father    • Cancer Maternal Grandmother         breast   • Cancer Maternal Grandfather         leukemia   • Diabetes Paternal Aunt    • Thyroid Paternal Grandmother    • Psychiatric Illness Sister         Panic attacks   • Heart Disease Neg Hx    • Stroke Neg Hx          PHYSICAL EXAM  VITAL SIGNS: /56   Pulse 89   Temp 36.7 °C (98 °F) (Temporal)   Resp 18   Ht 1.524 m (5')   Wt 66.6 kg (146 lb 13.2 oz)   LMP 2019 (Approximate)   SpO2 100%   BMI 28.68 kg/m²   Constitutional: Well-nourished, Well developed. In mild distress.   Head: Normocephalic, Atraumatic  Eyes: PERRL, sclera anicteric, EOMI, no lid swelling  ENT: No nasal discharge or epistaxis. No facial deformity. Mucous membranes are moist.   Cardiovascular: Regular rate and rhythm without S3,S4, or murmur.   Lungs: No respiratory distress. No cough. Lungs are clear bilaterally. No rales, rhonchi, or wheezing.   Abdomen: Soft, non-tender, non-distended. Without organomegaly. No rebound, rigidity, or guarding. No masses or abnormal pulsations.   Rectal: Soft brown stool that is heme negative  Skin: Without significant rash or lesions.    Extremities: No deformity. Non-tender. No swelling.  Neurologic: Awake and alert. Normal speech. No sensory deficits. No motor deficits. Ambulatory with a steady gait.       DIAGNOSTIC STUDIES / PROCEDURES  Results for orders placed or performed during the hospital encounter of 19   CBC WITH  DIFFERENTIAL   Result Value Ref Range    WBC 7.6 4.8 - 10.8 K/uL    RBC 4.21 4.20 - 5.40 M/uL    Hemoglobin 7.1 (L) 12.0 - 16.0 g/dL    Hematocrit 27.3 (L) 37.0 - 47.0 %    MCV 64.8 (L) 81.4 - 97.8 fL    MCH 16.9 (L) 27.0 - 33.0 pg    MCHC 26.0 (L) 33.6 - 35.0 g/dL    RDW 46.5 35.9 - 50.0 fL    Platelet Count 572 (H) 164 - 446 K/uL    MPV 11.0 9.0 - 12.9 fL    Neutrophils-Polys 54.40 44.00 - 72.00 %    Lymphocytes 36.30 22.00 - 41.00 %    Monocytes 6.40 0.00 - 13.40 %    Eosinophils 1.30 0.00 - 6.90 %    Basophils 1.50 0.00 - 1.80 %    Immature Granulocytes 0.10 0.00 - 0.90 %    Nucleated RBC 0.00 /100 WBC    Neutrophils (Absolute) 4.11 2.00 - 7.15 K/uL    Lymphs (Absolute) 2.74 1.00 - 4.80 K/uL    Monos (Absolute) 0.48 0.00 - 0.85 K/uL    Eos (Absolute) 0.10 0.00 - 0.51 K/uL    Baso (Absolute) 0.11 0.00 - 0.12 K/uL    Immature Granulocytes (abs) 0.01 0.00 - 0.11 K/uL    NRBC (Absolute) 0.00 K/uL   BASIC METABOLIC PANEL   Result Value Ref Range    Sodium 138 135 - 145 mmol/L    Potassium 3.7 3.6 - 5.5 mmol/L    Chloride 102 96 - 112 mmol/L    Co2 23 20 - 33 mmol/L    Glucose 93 65 - 99 mg/dL    Bun 21 8 - 22 mg/dL    Creatinine 0.73 0.50 - 1.40 mg/dL    Calcium 9.3 8.4 - 10.2 mg/dL    Anion Gap 13.0 (H) 0.0 - 11.9   URINALYSIS CULTURE, IF INDICATED   Result Value Ref Range    Color Yellow     Character Hazy (A)     Specific Gravity <=1.005 <1.035    Ph 5.5 5.0 - 8.0    Glucose Negative Negative mg/dL    Ketones Negative Negative mg/dL    Protein Negative Negative mg/dL    Bilirubin Negative Negative    Nitrite Negative Negative    Leukocyte Esterase Small (A) Negative    Occult Blood Negative Negative    Micro Urine Req Microscopic    COD (ADULT)   Result Value Ref Range    ABO Grouping Only O     Rh Grouping Only POS     Antibody Screen-Cod NEG     Component R       R3                  Red Blood Cells3    [id number]   issued       08/28/19   21:04      Product Type Red Blood Cells LR Pheresis     Dispense Status  issued     Unit Number (Barcoded) [id number]     Product Code (Barcoded) [id number]     Blood Type (Barcoded) 5100    HCG QUAL SERUM   Result Value Ref Range    Beta-Hcg Qualitative Serum Negative Negative   ESTIMATED GFR   Result Value Ref Range    GFR If African American >60 >60 mL/min/1.73 m 2    GFR If Non African American >60 >60 mL/min/1.73 m 2   ABO Rh Confirm   Result Value Ref Range    ABO Rh Confirm O POS    URINE MICROSCOPIC (W/UA)   Result Value Ref Range    WBC 10-20 (A) /hpf    RBC 0-2 /hpf    Bacteria Moderate (A) None /hpf    Epithelial Cells Few Few /hpf        COURSE & MEDICAL DECISION MAKING:  Nursing notes, VS, PMSFHx reviewed in chart.     Pt is orthostatic negative and  only once in the ED.  Otherwise stable.  She has no obvious source of bleeding but is clearly iron def looking at recent labs.  She has had severe anemia this year without any new source of bleeding.  She was given 1u PRBCs and started on iron supplements.  She needs to see hematology and GI to cont the workup.  She should see endocrinology for her hypothyroidism as she needs a change in her medications.  She will be treated for UTI with Keflex and Pyridium.  She can return if worse.  She should also follow up with PCP.    FINAL IMPRESSION:  1. Iron deficiency anemia due to chronic blood loss    2. Acute cystitis with hematuria         DISPOSITION:  Home stable    Please note that this dictation was created using voice recognition software. I have worked with consultants from the vendor as well as technical experts from [facility] to optimize the interface. I have made every reasonable attempt to correct obvious errors, but I expect that there are errors of grammar and possibly content that I did not discover before finalizing the note.

## 2019-08-30 LAB
BACTERIA UR CULT: ABNORMAL
SIGNIFICANT IND 70042: ABNORMAL
SITE SITE: ABNORMAL
SOURCE SOURCE: ABNORMAL

## 2019-08-31 LAB
BACTERIA UR CULT: ABNORMAL
BACTERIA UR CULT: ABNORMAL
SIGNIFICANT IND 70042: ABNORMAL
SITE SITE: ABNORMAL
SOURCE SOURCE: ABNORMAL

## 2019-08-31 NOTE — ED NOTES
ED Positive Culture Follow-up/Notification Note:    Date: 8/31/19     Patient seen in the ED on 8/28/2019 after being referred by PCP for low HGB/HCT. Pt report was evaluated by PCP for dysuria, frequency, and flank pain and diagnosed with UTI. Pt also reports tiredness and fatigue.     1. Iron deficiency anemia due to chronic blood loss    2. Acute cystitis with hematuria       Discharge Medication List as of 8/28/2019 11:24 PM      START taking these medications    Details   cephALEXin (KEFLEX) 500 MG Cap Take 1 Cap by mouth 3 times a day for 7 days., Disp-21 Cap, R-0, Print Rx Paper      phenazopyridine (PYRIDIUM) 200 MG Tab Take 1 Tab by mouth 3 times a day for 6 doses., Disp-6 Tab, R-0, Print Rx Paper      ferrous sulfate 325 (65 Fe) MG tablet Take 1 Tab by mouth every day., Disp-30 Tab, R-0, Print Rx Paper           Medications given in ED:  -1 unit PRBC  -Keflex 500mg PO once  -Pyridium 200mg PO once  -NS 30mL/hr    Allergies: Patient has no known allergies.     Vitals:    08/28/19 2251 08/28/19 2307 08/28/19 2320 08/28/19 2334   BP:  105/50 112/64    Pulse: 89 91 90    Resp: 18      Temp:    36.3 °C (97.3 °F)   TempSrc:    Temporal   SpO2: 100% 100% 99%    Weight:       Height:           Final cultures:   Results     Procedure Component Value Units Date/Time    URINE CULTURE(NEW) [[id number]]  (Abnormal)  (Susceptibility) Collected:  08/28/19 1718    Order Status:  Completed Specimen:  Urine Updated:  08/30/19 0949     Significant Indicator POS     Source UR     Site -     Culture Result Mixed skin adilia <10,000 cfu/mL      Klebsiella pneumoniae  10-50,000 cfu/mL        Streptococcus agalactiae (Group B)  <10,000 cfu/mL      Susceptibility     Klebsiella pneumoniae (1)     Antibiotic Interpretation Microscan Method Status    Ceftriaxone Sensitive <=8 mcg/mL MARGARITA Final    Ceftazidime Sensitive <=1 mcg/mL MARGARITA Final    Cephalothin Sensitive <=8 mcg/mL MARGARITA Final    Cefotaxime Sensitive <=2 mcg/mL MARGARITA Final     Ciprofloxacin Sensitive <=1 mcg/mL MARGARITA Final    Cefepime Sensitive <=8 mcg/mL MARGARITA Final    Cefuroxime Sensitive <=4 mcg/mL MARGARITA Final    Ampicillin Resistant >16 mcg/mL MARGARITA Final    Cefotetan Sensitive <=16 mcg/mL MARGARITA Final    Tobramycin Sensitive <=4 mcg/mL MARGARITA Final    Nitrofurantoin Resistant >64 mcg/mL MARGARITA Final    Gentamicin Sensitive <=4 mcg/mL MARGARITA Final    Levofloxacin Sensitive <=2 mcg/mL MARGARITA Final    Pip/Tazobactam Sensitive <=16 mcg/mL MARGARITA Final    Piperacillin Sensitive <=16 mcg/mL MARGARITA Final    Trimeth/Sulfa Sensitive <=2/38 mcg/mL MARGARITA Final    Tigecycline Sensitive <=2 mcg/mL MARGARITA Final                   URINALYSIS CULTURE, IF INDICATED [[id number]]  (Abnormal) Collected:  08/28/19 1718    Order Status:  Completed Specimen:  Urine Updated:  08/28/19 2019     Color Yellow     Character Hazy     Specific Gravity <=1.005     Ph 5.5     Glucose Negative mg/dL      Ketones Negative mg/dL      Protein Negative mg/dL      Bilirubin Negative     Nitrite Negative     Leukocyte Esterase Small     Occult Blood Negative     Micro Urine Req Microscopic          Plan:   Appropriate antibiotic therapy prescribed. No changes required based upon culture result.  Sent letter to patient to notify of positive culture result and encourage compliance with prescribed antibiotics.        Froilan Soto, PharmD

## 2019-09-06 ENCOUNTER — HOSPITAL ENCOUNTER (OUTPATIENT)
Dept: LAB | Facility: MEDICAL CENTER | Age: 34
End: 2019-09-06
Attending: INTERNAL MEDICINE
Payer: COMMERCIAL

## 2019-09-06 ENCOUNTER — OFFICE VISIT (OUTPATIENT)
Dept: MEDICAL GROUP | Age: 34
End: 2019-09-06
Payer: COMMERCIAL

## 2019-09-06 ENCOUNTER — TELEPHONE (OUTPATIENT)
Dept: MEDICAL GROUP | Age: 34
End: 2019-09-06

## 2019-09-06 VITALS
SYSTOLIC BLOOD PRESSURE: 122 MMHG | BODY MASS INDEX: 27.72 KG/M2 | DIASTOLIC BLOOD PRESSURE: 78 MMHG | WEIGHT: 141.2 LBS | HEIGHT: 60 IN | TEMPERATURE: 98.3 F | HEART RATE: 84 BPM | OXYGEN SATURATION: 98 %

## 2019-09-06 DIAGNOSIS — Z98.84 S/P GASTRIC BYPASS: ICD-10-CM

## 2019-09-06 DIAGNOSIS — Z97.5 IUD (INTRAUTERINE DEVICE) IN PLACE: ICD-10-CM

## 2019-09-06 DIAGNOSIS — E03.1 CONGENITAL HYPOTHYROIDISM WITHOUT GOITER: ICD-10-CM

## 2019-09-06 DIAGNOSIS — D50.0 IRON DEFICIENCY ANEMIA DUE TO CHRONIC BLOOD LOSS: ICD-10-CM

## 2019-09-06 DIAGNOSIS — R53.82 CHRONIC FATIGUE: ICD-10-CM

## 2019-09-06 DIAGNOSIS — D50.8 IRON DEFICIENCY ANEMIA SECONDARY TO INADEQUATE DIETARY IRON INTAKE: ICD-10-CM

## 2019-09-06 DIAGNOSIS — E55.9 VITAMIN D DEFICIENCY: ICD-10-CM

## 2019-09-06 DIAGNOSIS — E53.8 VITAMIN B 12 DEFICIENCY: ICD-10-CM

## 2019-09-06 LAB
ANISOCYTOSIS BLD QL SMEAR: ABNORMAL
BASOPHILS # BLD AUTO: 1.9 % (ref 0–1.8)
BASOPHILS # BLD: 0.12 K/UL (ref 0–0.12)
COMMENT 1642: NORMAL
EOSINOPHIL # BLD AUTO: 0.11 K/UL (ref 0–0.51)
EOSINOPHIL NFR BLD: 1.7 % (ref 0–6.9)
ERYTHROCYTE [DISTWIDTH] IN BLOOD BY AUTOMATED COUNT: 56.7 FL (ref 35.9–50)
HCT VFR BLD AUTO: 32.6 % (ref 37–47)
HGB BLD-MCNC: 8.6 G/DL (ref 12–16)
HYPOCHROMIA BLD QL SMEAR: ABNORMAL
IMM GRANULOCYTES # BLD AUTO: 0.01 K/UL (ref 0–0.11)
IMM GRANULOCYTES NFR BLD AUTO: 0.2 % (ref 0–0.9)
LYMPHOCYTES # BLD AUTO: 2.66 K/UL (ref 1–4.8)
LYMPHOCYTES NFR BLD: 42 % (ref 22–41)
MACROCYTES BLD QL SMEAR: ABNORMAL
MCH RBC QN AUTO: 18.1 PG (ref 27–33)
MCHC RBC AUTO-ENTMCNC: 26.4 G/DL (ref 33.6–35)
MCV RBC AUTO: 68.5 FL (ref 81.4–97.8)
MICROCYTES BLD QL SMEAR: ABNORMAL
MONOCYTES # BLD AUTO: 0.46 K/UL (ref 0–0.85)
MONOCYTES NFR BLD AUTO: 7.3 % (ref 0–13.4)
MORPHOLOGY BLD-IMP: NORMAL
NEUTROPHILS # BLD AUTO: 2.97 K/UL (ref 2–7.15)
NEUTROPHILS NFR BLD: 46.9 % (ref 44–72)
NRBC # BLD AUTO: 0 K/UL
NRBC BLD-RTO: 0 /100 WBC
OVALOCYTES BLD QL SMEAR: NORMAL
PLATELET # BLD AUTO: 589 K/UL (ref 164–446)
PLATELET BLD QL SMEAR: NORMAL
PMV BLD AUTO: 10.8 FL (ref 9–12.9)
POIKILOCYTOSIS BLD QL SMEAR: NORMAL
RBC # BLD AUTO: 4.76 M/UL (ref 4.2–5.4)
RBC BLD AUTO: PRESENT
WBC # BLD AUTO: 6.3 K/UL (ref 4.8–10.8)

## 2019-09-06 PROCEDURE — 99214 OFFICE O/P EST MOD 30 MIN: CPT | Performed by: INTERNAL MEDICINE

## 2019-09-06 PROCEDURE — 36415 COLL VENOUS BLD VENIPUNCTURE: CPT

## 2019-09-06 PROCEDURE — 85025 COMPLETE CBC W/AUTO DIFF WBC: CPT

## 2019-09-06 ASSESSMENT — ENCOUNTER SYMPTOMS
CARDIOVASCULAR NEGATIVE: 1
FLANK PAIN: 0
EYES NEGATIVE: 1
MUSCULOSKELETAL NEGATIVE: 1
PSYCHIATRIC NEGATIVE: 1
ABDOMINAL PAIN: 0
RESPIRATORY NEGATIVE: 1
NEUROLOGICAL NEGATIVE: 1

## 2019-09-06 NOTE — TELEPHONE ENCOUNTER
MEDICATION PRIOR AUTHORIZATION NEEDED:    1. Name of Medication: ferrous sulfate (FEOSOL) 300 (60 Fe) MG/5ML Syrup    2. Requested By (Name of Pharmacy):   [id number] - [place], [place] - [address] [address]  Phone: [phone] Fax: [phone]     3. Is insurance on file current? [employer] [id number]    4. What is the name & phone number of the 3rd party payor? Cover My Meds Key: [id number]      Would you like a PAR started?

## 2019-09-06 NOTE — PROGRESS NOTES
Subjective:      Isabel Curry is a 34 y.o. female who presents with Follow-Up (HF/ lab review) and Fatigue (still feeling tired)            HPI    ROS       Objective:     /78 (BP Location: Right arm, Patient Position: Sitting, BP Cuff Size: Adult)   Pulse 84   Temp 36.8 °C (98.3 °F) (Temporal)   Ht 1.524 m (5')   Wt 64 kg (141 lb 3.2 oz)   LMP 08/27/2019 (Approximate) Comment: last day, 08/31  SpO2 98%   BMI 27.58 kg/m²      Physical Exam            Assessment/Plan:     1. Iron deficiency anemia secondary to inadequate dietary iron intake  ***  - ferrous sulfate (FEOSOL) 300 (60 Fe) MG/5ML Syrup; Take 5 mL by mouth 3 times a day.  Dispense: 450 mL; Refill: 11  - CBC WITH DIFFERENTIAL; Future  - CBC WITH DIFFERENTIAL; Future    2. Vitamin B 12 deficiency  ***  - cyanocobalamin (VITAMIN B12) 1000 MCG Tab; Take 1 Tab by mouth every day.  Dispense: 100 Tab; Refill: 4  - VITAMIN B12; Future    3. Vitamin D deficiency  ***  - VITAMIN D,25 HYDROXY; Future    4. S/P gastric bypass  ***  - ferrous sulfate (FEOSOL) 300 (60 Fe) MG/5ML Syrup; Take 5 mL by mouth 3 times a day.  Dispense: 450 mL; Refill: 11  - cyanocobalamin (VITAMIN B12) 1000 MCG Tab; Take 1 Tab by mouth every day.  Dispense: 100 Tab; Refill: 4    5. Iron deficiency anemia due to chronic blood loss  ***  - REFERRAL TO GASTROENTEROLOGY    6. Chronic fatigue  ***  - Comp Metabolic Panel; Future    7. IUD (intrauterine device) in place  ***  - REFERRAL TO GYNECOLOGY    8. Congenital hypothyroidism without goiter  ***  - TSH; Future

## 2019-09-06 NOTE — PROGRESS NOTES
Subjective:      Isabel Curry is a 34 y.o. female who presents with Follow-Up (HF/ lab review) and Fatigue (still feeling tired)        HPI     The patient is here for hospital follow up.     On 8/28/19 I saw the patient for dysuria, hematuria, and urinary urgency/frequency. At that time she had associated fatigue, low back pain, and pelvic pain. UA in office showed signs of infection, so I started her on Bactrim DS. She has been complaint with the medication and has 1 day left in the course. Her urinary symptoms are completely resolved today.     She is s/p gastric bypass surgery, however has not been taking her iron or B12 supplements. Blood work in office on 8/28/19 showed a hemoglobin of 6.9, a hematocrit of 27.0, and Ferritin of 2.6, so I sent her to [facility] to be evaluated for anemia. In the ED she received 1 unit of blood, and both levels were minimally improved at 7.1 and 27.3 respectively. She noted feeling significantly improved after receiving the blood transfusion. She was discharged with referral to GI and hematology for further follow up, however GUAIAC testing was hemoccult negative. She is still not taking any iron supplements, and is still feeling fatigued. She continues to have regular menstrual cycles, but states she has never had a heavy flow.     While in the ED they also noted she is taking too high a dose of levothyroxine, with a TSH of 0.210 and suggested she follow up with Endocrinology to adjust her medication. I do not think an Endocrinology referral is necessary, and we will adjust her dose from 125 mcg to 112 mcg. 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      The patient is requesting a referral to OB-GYN to have her IUD evaluated. She had it put in 4 years ago after the birth of her second son, and is not sure when it should be replaced. She is concerned this may be attributing to her chronic anemia.     Patient Active Problem List    Diagnosis   • Congenital hypothyroidism without goiter   • Iron deficiency anemia due to chronic blood loss   • Attention deficit hyperactivity disorder (ADHD), combined type- dr bethanie gray, psychiatrist   • Vitamin D deficiency   • S/P gastric bypass   • Primary insomnia   • Hyperglycemia   • Fatigue   • Vitamin B 12 deficiency   • Iron deficiency anemia secondary to inadequate dietary iron intake       Outpatient Medications Prior to Visit   Medication Sig Dispense Refill   • levothyroxine (SYNTHROID) 125 MCG Tab Take 2 Tabs by mouth Every morning on an empty stomach. 180 Tab 4   • ferrous sulfate 325 (65 Fe) MG tablet Take 1 Tab by mouth every day. 30 Tab 0   • sulfamethoxazole-trimethoprim (BACTRIM DS) 800-160 MG tablet Take 1 Tab by mouth 2 times a day. 20 Tab 0   • mirtazapine (REMERON) 15 MG Tab Take 15 mg by mouth every evening.     • azithromycin (ZITHROMAX) 250 MG Tab Take 2 tabs today then 1 per day for 4 days (Patient not taking: Reported on 8/28/2019) 6 Tab 1   • ADDERALL XR, 20MG, 20 MG per XR capsule        No facility-administered medications prior to visit.         No Known Allergies    Review of Systems   Constitutional: Positive for malaise/fatigue.   HENT: Negative.    Eyes: Negative.    Respiratory: Negative.    Cardiovascular: Negative.    Gastrointestinal: Negative for abdominal pain.   Genitourinary: Negative for dysuria, flank pain, frequency, hematuria and urgency.   Musculoskeletal: Negative.    Skin: Negative.    Neurological: Negative.    Endo/Heme/Allergies: Negative.    Psychiatric/Behavioral: Negative.    All other systems reviewed and are negative.         Objective:     /78 (BP Location: Right arm, Patient Position: Sitting, BP Cuff Size: Adult)   Pulse 84   Temp 36.8 °C (98.3 °F) (Temporal)   Ht 1.524 m (5')   Wt 64 kg (141 lb 3.2 oz)   LMP 08/27/2019 (Approximate) Comment: last day, 08/31  SpO2 98%   BMI 27.58 kg/m²     Physical Exam   Constitutional: Oriented to  person, place, and time. Appears well-developed and well-nourished. No distress.   Head: Normocephalic and atraumatic.   Right Ear: External ear normal.   Left Ear: External ear normal.   Nose: Nose normal.   Mouth/Throat: Oropharynx is clear and moist. No oropharyngeal exudate.   Eyes: Pupils are equal, round, and reactive to light. Conjunctivae and EOM are normal. Right eye exhibits no discharge. Left eye exhibits no discharge. No scleral icterus.   Neck: Normal range of motion. Neck supple. No JVD present. No tracheal deviation present. No thyromegaly present.   Cardiovascular: Normal rate, regular rhythm, normal heart sounds and intact distal pulses.  Exam reveals no gallop and no friction rub.    No murmur heard.  Pulmonary/Chest: Effort normal. No stridor. No respiratory distress. No wheezing or rales. No tenderness.   Abdominal: Soft. Bowel sounds are normal. No distension and no mass. There is no tenderness. There is no rebound and no guarding. No hernia.   Musculoskeletal: Normal range of motion No edema or tenderness.   Lymphadenopathy: No cervical adenopathy.   Neurological: Alert and oriented to person, place, and time. Normal reflexes. Normal reflexes. No cranial nerve deficit. Normal muscle tone. Coordination normal.   Skin: Skin is warm and dry. No rash noted. Not diaphoretic. No erythema. No pallor.   Psychiatric: Normal mood and affect. Behavior is normal. Judgment and thought content normal.   Nursing note and vitals reviewed.      Lab Results   Component Value Date/Time    HBA1C 5.6 08/28/2019 09:42 AM     Lab Results   Component Value Date/Time    SODIUM 138 08/28/2019 05:45 PM    POTASSIUM 3.7 08/28/2019 05:45 PM    CHLORIDE 102 08/28/2019 05:45 PM    CO2 23 08/28/2019 05:45 PM    GLUCOSE 93 08/28/2019 05:45 PM    BUN 21 08/28/2019 05:45 PM    CREATININE 0.73 08/28/2019 05:45 PM    ALKPHOSPHAT 67 08/28/2019 09:42 AM    ASTSGOT 20 08/28/2019 09:42 AM    ALTSGPT 12 08/28/2019 09:42 AM     TBILIRUBIN 0.3 08/28/2019 09:42 AM     No results found for: INR  Lab Results   Component Value Date/Time    CHOLSTRLTOT 175 08/28/2019 09:42 AM    LDL 83 08/28/2019 09:42 AM    HDL 74 08/28/2019 09:42 AM    TRIGLYCERIDE 90 08/28/2019 09:42 AM       No results found for: TESTOSTERONE  No results found for: TSH  Lab Results   Component Value Date/Time    FREET4 0.38 (L) 11/02/2017 12:05 PM    FREET4 0.50 (L) 01/07/2017 08:45 AM     No results found for: URICACID  No components found for: VITB12  Lab Results   Component Value Date/Time    25HYDROXY 18 (L) 08/28/2019 09:42 AM    25HYDROXY 26 (L) 07/02/2018 12:00 PM          Assessment/Plan:     1. Iron deficiency anemia secondary to inadequate dietary iron intake  The patient was seen at [facility] for anemia on 8/28/19 with a hemoglobin of 6.9 and a hematocrit of 27.0. After 1 unit of blood her levels improved to 7.1 and 27.3 respectively. I instructed the patient to begin taking 300 mg/5mL of iron daily. She should also take 1000 IU of Vitamin D supplementation to help her body absorb the iron. She understands and will comply. We will recheck her iron levels today, and again in 3 months to make sure she is improving. She will follow up with GI as directed by the ED for an upper and lower endoscopy, however given her hemoccult negative GUAIAC testing I do not believe this is the source of her bleeding.   - ferrous sulfate (FEOSOL) 300 (60 Fe) MG/5ML Syrup; Take 5 mL by mouth 3 times a day.  Dispense: 450 mL; Refill: 11  - CBC WITH DIFFERENTIAL; Future  - CBC WITH DIFFERENTIAL; Future    2. Vitamin B 12 deficiency  Patient has not been taking her vitamin B12 supplementation since her gastric bypass surgery. I instructed her to begin taking 1000 mcg of B12 daily. We will recheck her level in 3 months.   - cyanocobalamin (VITAMIN B12) 1000 MCG Tab; Take 1 Tab by mouth every day.  Dispense: 100 Tab; Refill: 4  - VITAMIN B12; Future    3. Vitamin D deficiency  Patient's vitamin  D is significantly decreased at 18. I instructed her to begin taking 1,000 IU of vitamin D daily. We will recheck her level in 3 months.   - VITAMIN D,25 HYDROXY; Future    4. S/P gastric bypass  The patient was initially taking B12 and Iron supplementation following her surgery, howver she stopped taking her supplements on her own. I believe this may have attributed to her anemia. I have instructed her to begin taking 300 mg/5mL of iron daily with 1000 IU of vitamin D to help her body absorb the iron.   - ferrous sulfate (FEOSOL) 300 (60 Fe) MG/5ML Syrup; Take 5 mL by mouth 3 times a day.  Dispense: 450 mL; Refill: 11  - cyanocobalamin (VITAMIN B12) 1000 MCG Tab; Take 1 Tab by mouth every day.  Dispense: 100 Tab; Refill: 4    5. Iron deficiency anemia due to chronic blood loss  See #1.   - REFERRAL TO GASTROENTEROLOGY    6. Chronic fatigue  This is likely due to her anemia and should improve with iron supplementation.   - Comp Metabolic Panel; Future    7. IUD (intrauterine device) in place  Patient had an IUD placed 4 years ago and is not sure when it should be replaced. I have referred her to OB-GYN for follow up.   - REFERRAL TO GYNECOLOGY    8. Congenital hypothyroidism without goiter  Patient's TSH is over replaced at 0.210. I have decreased her dose of levothyroxine to 112 mcg.  - TSH; Future        Follow up in 3 months.       30 minute face-to-face encounter took place today.  More than half of this time was spent in the coordination of care of the above problems, as well as counseling.     IRita (Den), am scribing for, and in the presence of, Alfred Gonzalez M.D..    Electronically signed by: Rita Sanchez (Den), 9/6/2019    IAlfred M.D., personally performed the services described in this documentation, as scribed by Rita Sanchez in my presence, and it is both accurate and complete.

## 2019-09-07 ENCOUNTER — HOSPITAL ENCOUNTER (EMERGENCY)
Facility: MEDICAL CENTER | Age: 34
End: 2019-09-07
Attending: EMERGENCY MEDICINE
Payer: COMMERCIAL

## 2019-09-07 ENCOUNTER — APPOINTMENT (OUTPATIENT)
Dept: RADIOLOGY | Facility: MEDICAL CENTER | Age: 34
End: 2019-09-07
Attending: EMERGENCY MEDICINE
Payer: COMMERCIAL

## 2019-09-07 VITALS
HEIGHT: 60 IN | OXYGEN SATURATION: 100 % | SYSTOLIC BLOOD PRESSURE: 116 MMHG | TEMPERATURE: 97.4 F | WEIGHT: 143.3 LBS | BODY MASS INDEX: 28.13 KG/M2 | RESPIRATION RATE: 20 BRPM | DIASTOLIC BLOOD PRESSURE: 68 MMHG | HEART RATE: 70 BPM

## 2019-09-07 DIAGNOSIS — D64.9 ANEMIA, UNSPECIFIED TYPE: ICD-10-CM

## 2019-09-07 LAB
ABO GROUP BLD: NORMAL
ANION GAP SERPL CALC-SCNC: 11 MMOL/L (ref 0–11.9)
APPEARANCE UR: CLEAR
BASOPHILS # BLD AUTO: 1.4 % (ref 0–1.8)
BASOPHILS # BLD: 0.1 K/UL (ref 0–0.12)
BILIRUB UR QL STRIP.AUTO: NEGATIVE
BLD GP AB SCN SERPL QL: NORMAL
BUN SERPL-MCNC: 19 MG/DL (ref 8–22)
CALCIUM SERPL-MCNC: 9.2 MG/DL (ref 8.4–10.2)
CHLORIDE SERPL-SCNC: 101 MMOL/L (ref 96–112)
CO2 SERPL-SCNC: 23 MMOL/L (ref 20–33)
COLOR UR: YELLOW
CREAT SERPL-MCNC: 0.76 MG/DL (ref 0.5–1.4)
EOSINOPHIL # BLD AUTO: 0.1 K/UL (ref 0–0.51)
EOSINOPHIL NFR BLD: 1.4 % (ref 0–6.9)
ERYTHROCYTE [DISTWIDTH] IN BLOOD BY AUTOMATED COUNT: 54.6 FL (ref 35.9–50)
GLUCOSE SERPL-MCNC: 90 MG/DL (ref 65–99)
GLUCOSE UR STRIP.AUTO-MCNC: NEGATIVE MG/DL
HCG SERPL QL: NEGATIVE
HCT VFR BLD AUTO: 29.1 % (ref 37–47)
HGB BLD-MCNC: 7.9 G/DL (ref 12–16)
IMM GRANULOCYTES # BLD AUTO: 0.02 K/UL (ref 0–0.11)
IMM GRANULOCYTES NFR BLD AUTO: 0.3 % (ref 0–0.9)
KETONES UR STRIP.AUTO-MCNC: ABNORMAL MG/DL
LEUKOCYTE ESTERASE UR QL STRIP.AUTO: NEGATIVE
LYMPHOCYTES # BLD AUTO: 3.45 K/UL (ref 1–4.8)
LYMPHOCYTES NFR BLD: 47.1 % (ref 22–41)
MCH RBC QN AUTO: 18.2 PG (ref 27–33)
MCHC RBC AUTO-ENTMCNC: 27.1 G/DL (ref 33.6–35)
MCV RBC AUTO: 67.2 FL (ref 81.4–97.8)
MICRO URNS: ABNORMAL
MONOCYTES # BLD AUTO: 0.47 K/UL (ref 0–0.85)
MONOCYTES NFR BLD AUTO: 6.4 % (ref 0–13.4)
NEUTROPHILS # BLD AUTO: 3.19 K/UL (ref 2–7.15)
NEUTROPHILS NFR BLD: 43.4 % (ref 44–72)
NITRITE UR QL STRIP.AUTO: NEGATIVE
NRBC # BLD AUTO: 0 K/UL
NRBC BLD-RTO: 0 /100 WBC
PH UR STRIP.AUTO: 5.5 [PH] (ref 5–8)
PLATELET # BLD AUTO: 503 K/UL (ref 164–446)
PMV BLD AUTO: 10.6 FL (ref 9–12.9)
POTASSIUM SERPL-SCNC: 4 MMOL/L (ref 3.6–5.5)
PROT UR QL STRIP: NEGATIVE MG/DL
RBC # BLD AUTO: 4.33 M/UL (ref 4.2–5.4)
RBC UR QL AUTO: NEGATIVE
RH BLD: NORMAL
SODIUM SERPL-SCNC: 135 MMOL/L (ref 135–145)
SP GR UR STRIP.AUTO: 1.02
WBC # BLD AUTO: 7.3 K/UL (ref 4.8–10.8)

## 2019-09-07 PROCEDURE — 80048 BASIC METABOLIC PNL TOTAL CA: CPT

## 2019-09-07 PROCEDURE — 86900 BLOOD TYPING SEROLOGIC ABO: CPT

## 2019-09-07 PROCEDURE — 84703 CHORIONIC GONADOTROPIN ASSAY: CPT

## 2019-09-07 PROCEDURE — 86850 RBC ANTIBODY SCREEN: CPT

## 2019-09-07 PROCEDURE — 85025 COMPLETE CBC W/AUTO DIFF WBC: CPT

## 2019-09-07 PROCEDURE — 99284 EMERGENCY DEPT VISIT MOD MDM: CPT

## 2019-09-07 PROCEDURE — 86901 BLOOD TYPING SEROLOGIC RH(D): CPT

## 2019-09-07 PROCEDURE — 76856 US EXAM PELVIC COMPLETE: CPT

## 2019-09-07 PROCEDURE — 81003 URINALYSIS AUTO W/O SCOPE: CPT

## 2019-09-08 ASSESSMENT — ENCOUNTER SYMPTOMS
NECK PAIN: 0
BLURRED VISION: 0
ABDOMINAL PAIN: 0
COUGH: 0
CHILLS: 0
EYE REDNESS: 0
VOMITING: 0
FOCAL WEAKNESS: 0
SORE THROAT: 0
SEIZURES: 0
FEVER: 0
BACK PAIN: 0
SHORTNESS OF BREATH: 0
HEADACHES: 0

## 2019-09-08 NOTE — ED PROVIDER NOTES
ED Provider Note    CHIEF COMPLAINT  Chief Complaint   Patient presents with   • Abnormal Labs       HPI  Isabel Curry is a 34 y.o. female with past medical history of bipolar depression, hypothyroidism, history of gastric bypass 8 years prior, recent anemia who presents with concern for abnormal labs.  The patient was seen here approximately 10 days ago due to anemia requiring blood transfusion.  She was instructed to follow-up as an outpatient with gastroenterology as it was thought to be possibly due to GI bleeding.  The patient states she was initially feeling improved however the last 2 days she has had increasing fatigue.  She endorses vaginal bleeding which started 2 days prior which she does not feel is consistent with her menstrual cycle.  She has not noticed any blood in her stool.  She patient denies any complaints of pain at this time.  She is currently on antibiotics for urinary tract infection however denies dysuria or hematuria.  She is taking iron supplementation.    REVIEW OF SYSTEMS  See HPI for further details.   Review of Systems   Constitutional: Positive for malaise/fatigue. Negative for chills and fever.   HENT: Negative for sore throat.    Eyes: Negative for blurred vision and redness.   Respiratory: Negative for cough and shortness of breath.    Cardiovascular: Negative for chest pain and leg swelling.   Gastrointestinal: Negative for abdominal pain and vomiting.   Genitourinary: Negative for dysuria and urgency.        Vaginal bleeding   Musculoskeletal: Negative for back pain and neck pain.   Skin: Negative for rash.   Neurological: Negative for focal weakness, seizures and headaches.   Psychiatric/Behavioral: Negative for suicidal ideas.         PAST MEDICAL HISTORY   has a past medical history of ADD (attention deficit disorder), ADHD, Bipolar affective (HCC), Depression, VALENTE (generalized anxiety disorder), H/O gastric bypass, History of anemia, Hypothyroidism, and Major  depression.    SOCIAL HISTORY  Social History     Tobacco Use   • Smoking status: Light Tobacco Smoker     Packs/day: 0.10     Years: 6.00     Pack years: 0.60     Types: Cigarettes     Last attempt to quit: 2014     Years since quittin.7   • Smokeless tobacco: Never Used   • Tobacco comment: 1 cig per day   Substance and Sexual Activity   • Alcohol use: No     Alcohol/week: 0.0 oz     Comment: 1-2 per month   • Drug use: No   • Sexual activity: Not on file       SURGICAL HISTORY   has a past surgical history that includes gastric bypass laparoscopic (); primary c section (2011); primary c section (8/3/2015); abdominoplasty (3/21/2016); and mammoplasty augmentation (Bilateral, 2017).    CURRENT MEDICATIONS  Home Medications     Reviewed by David Farfan R.N. (Registered Nurse) on 19 at 1821  Med List Status: Partial   Medication Last Dose Status   ADDERALL XR, 20MG, 20 MG per XR capsule 2019 Active   cyanocobalamin (VITAMIN B12) 1000 MCG Tab 2019 Active   ferrous sulfate (FEOSOL) 300 (60 Fe) MG/5ML Syrup 2019 Active   levothyroxine (SYNTHROID) 125 MCG Tab 2019 Active                ALLERGIES  No Known Allergies    PHYSICAL EXAM   VITAL SIGNS: /68   Pulse 70   Temp 36.3 °C (97.4 °F) (Temporal)   Resp 20   Ht 1.524 m (5')   Wt 65 kg (143 lb 4.8 oz)   LMP 2019 (Approximate) Comment: last day,   SpO2 100%   BMI 27.99 kg/m²      Physical Exam   Constitutional: She is oriented to person, place, and time and well-developed, well-nourished, and in no distress. No distress.   Well-appearing young female   HENT:   Head: Normocephalic and atraumatic.   Eyes: Pupils are equal, round, and reactive to light. Conjunctivae are normal.   Neck: Normal range of motion. Neck supple.   Cardiovascular: Normal rate, regular rhythm and normal heart sounds.   Pulmonary/Chest: Effort normal and breath sounds normal. No respiratory distress.   Abdominal: Soft. She exhibits  no distension. There is no tenderness.   Genitourinary:   Genitourinary Comments: Rectal exam without gross blood or melena, FOBT negative.  No active vaginal bleeding.   Musculoskeletal: Normal range of motion. She exhibits no edema or tenderness.   Neurological: She is alert and oriented to person, place, and time.   Moving all extremities spontaneously   Skin: Skin is warm and dry.   Psychiatric: Affect normal.         DIAGNOSTIC STUDIES      LABS  Personally reviewed by me  Labs Reviewed   CBC WITH DIFFERENTIAL - Abnormal; Notable for the following components:       Result Value    Hemoglobin 7.9 (*)     Hematocrit 29.1 (*)     MCV 67.2 (*)     MCH 18.2 (*)     MCHC 27.1 (*)     RDW 54.6 (*)     Platelet Count 503 (*)     Neutrophils-Polys 43.40 (*)     Lymphocytes 47.10 (*)     All other components within normal limits   URINALYSIS,CULTURE IF INDICATED - Abnormal; Notable for the following components:    Ketones Trace (*)     All other components within normal limits   BASIC METABOLIC PANEL   COD (ADULT)   HCG QUAL SERUM   ESTIMATED GFR           RADIOLOGY  Personally reviewed by me  US-PELVIC COMPLETE (TRANSABDOMINAL/TRANSVAGINAL) (COMBO)   Final Result            IUD in place.      Unremarkable sonographic evidence of bilateral ovaries.            ED COURSE  Vitals:    09/07/19 2159 09/07/19 2219 09/07/19 2240 09/07/19 2250   BP: (!) 99/56 111/61 116/68    Pulse: 73 75 62 70   Resp:       Temp:       TempSrc:       SpO2: 98% 98% 99% 100%   Weight:       Height:             Medications administered:  Medications - No data to display      Old records personally reviewed:  Patient was seen here on 8/28/2019 after being sent here by her doctor for anemia.  Her hemoglobin at that time was found to be 7.1.  She was given 1 unit of PRBCs.  Fecal occult testing was negative for blood at that time.  She was discharged home on cephalexin for UTI as well as being started on iron supplementation.      MEDICAL DECISION  MAKING  Patient recently seen for anemia requiring blood transfusion, now presenting with increasing fatigue and concern for vaginal bleeding.  She is afebrile with normal vitals on arrival and well-appearing.  Abdominal exam is completely benign without concern for underlying surgical process such as bowel obstruction or perforation.  She has no evidence of active bleeding on exam either from the vagina or rectum.  FOBT is negative.  Labs demonstrate mildly decreased hemoglobin from her value yesterday however is not at the point where she would require transfusion.    Patient is not pregnant.  Pelvic ultrasound was performed with IUD in place however no evidence of other abnormality to explain the bleeding.  It does seem that its most likely coming from her GI tract and she is at risk for this due to her gastric bypass surgery however she has no active bleeding.  She appears stable at this time and can likely continue with outpatient follow-up.  We discussed importance of continued iron supplementation.  She has a referral in for an appointment with gastroenterology consultants.  She will discuss with her PCP and have a repeat CBC later next week.  The patient understands plan of care for discharge home as well as return precautions for changing or worsening symptoms.        IMPRESSION  (D64.9) Anemia, unspecified type    Disposition: Discharge home, stable condition  Results, diagnoses, and treatment options were discussed with the patient and/or family. Patient verbalized understanding of plan of care.    Patient referred to primary care provider for monitoring and treatment of blood pressure.      Discharge Medication List as of 9/7/2019 10:58 PM              Electronically signed by: Frances Lin, 9/7/2019 9:25 PM

## 2019-09-08 NOTE — ED NOTES
Presents with a hx of anemia.  She was seen in our department approximately a week ago with a unit of blood transfused.   Chief Complaint   Patient presents with   • Abnormal Labs     /79   Pulse 78   Temp 36.3 °C (97.4 °F) (Temporal)   Resp 20   Ht 1.524 m (5')   Wt 65 kg (143 lb 4.8 oz)   LMP 08/27/2019 (Approximate) Comment: last day, 08/31  SpO2 100%   BMI 27.99 kg/m²

## 2019-09-08 NOTE — ED NOTES
VSS . NAD noted. Remains on monitoring. Fall precautions in place. Call light in reach.  IV DC with cath intact. Bleeding controled. Appropriate bandage applied.  PT given DC instructions by Jana WU and states understanding. Denies need for assistance. Stable at DC. NAD noted. Ambulatory with ease.

## 2019-09-08 NOTE — DISCHARGE INSTRUCTIONS
You were seen in the Emergency Department for low blood counts.    Labs, urine test, ultrasound were completed without significant acute abnormalities, other than persistent anemia.    Please use 1,000mg of tylenol or 600mg of ibuprofen every 6 hours as needed for pain.  Continue taking iron supplementation.  Drink plenty of fluids.    Please follow up with your primary care physician for recheck of your blood counts middle of next week.  Call GI for scheduled outpatient follow up.    Return to the Emergency Department with persistent or increase bleeding from the vagina or rectum, lightheadedness or fainting, or other concerns.

## 2019-11-06 NOTE — PROGRESS NOTES
CBC/serum magnesium and phosphorus/CMP CHIEF COMPLAINT  Chief Complaint   Patient presents with   • Other     medication question    Anxiety    HPI  Patient is a 32 y.o. female patient who presents today for the following     Anxiety / Depression, Panic attacks  Bipolar, ADD  Interval course:  - on counseling, sertraline  - sx were up/down  - she told today that she has h/o bipolar   - in the past, she denied manic/hypomanic episodes    Onset:   Since adolescense  Since then symptoms were up/down, currently severe              Trigger: marriage problems, improved  Mood currently does affect her daily activities.    Previous medications:  none  Current treatment: sertraline, 1000 mg QD, counseling              - xanax prn    Risk factors:   · Depression, anxiety, panic attacks  · ADD  · Bipolar  · H/o phobia: no  · H/o panic attacks: no  · H/o hypomanic or manic episode: yes  · Substance abuse  (alcohol,  prescription drugs caffeine, tobacco): no  · Family support: yes  · Living alone:  no  · Family history of psych disorders: yes, mother and sister  · Stress: marital issues, improving  · PMH of abuse (sexual, physical, emotional abuse; neglect): no    VALENTE-7 score:  8/17   1. Feeling nervous, anxious, or on edge  3   2. Not being able to stop or control worrying 1   3. Worrying too much about different things 1   4. Trouble relaxing 2   5. Being so restless that it is hard to sit still 2   6. Becoming easily annoyed or irritable 2   7. Feeling afraid as if something awful might happen 0   Total score 11     Depression Screen (PHQ-2/PHQ-9) 1/18/2017 2/21/2017 8/1/2017   PHQ-2 Total Score 2 2 5   PHQ-2 Total Score - - -   PHQ-2 Total Score - - -   PHQ-9 Total Score 17 12 17     Congenital hypothyroidism  Dg/Onset: congenital  Levothyroxine, 250 mcg, taking daily early in am, before any po intake.  No temperature intolerance. No change in weight,  hair/skin quality, BMs.    No tremors, weakness.  No peripheral swelling.  No mood changes.  She gave birth to  baby boy 2 months ago.    Reviewed the last TSH.    FH: multiple    Reviewed PMH, PSH, FH, SH, ALL, HCM/IMM, no changes  Reviewed MEDS, no changes    Patient Active Problem List    Diagnosis Date Noted   • Encounter for cosmetic surgery 02/24/2017   • Major depressive disorder, recurrent, moderate (CMS-HCC) 01/18/2017   • VALENTE (generalized anxiety disorder) 12/08/2016   • Panic attacks 12/08/2016   • Congenital hypothyroidism without goiter 11/04/2015   • Health care maintenance 11/04/2015     CURRENT MEDICATIONS  Current Outpatient Prescriptions   Medication Sig Dispense Refill   • levothyroxine (SYNTHROID) 125 MCG Tab TAKE 1 TABLET BY MOUTH TWICE A DAY 90 Tab 0   • alprazolam (XANAX) 0.25 MG Tab Take 1 Tab by mouth at bedtime as needed for Sleep. 30 Tab 0   • sertraline (ZOLOFT) 100 MG Tab Take 1 Tab by mouth every day. 90 Tab 0     No current facility-administered medications for this visit.     ALLERGIES  Allergies: Review of patient's allergies indicates no known allergies.    PAST MEDICAL HISTORY  Past Medical History   Diagnosis Date   • ADD (attention deficit disorder)    • VALENTE (generalized anxiety disorder)    • Depression    • Major depression (CMS-HCC)    • History of anemia    • Hypothyroidism      congenital- Hypothyroid   • Bipolar affective (CMS-[facility])      SURGICAL HISTORY  She  has past surgical history that includes gastric bypass laparoscopic (2012); primary c section (7/16/2011); primary c section (8/3/2015); abdominoplasty (3/21/2016); and mammoplasty augmentation (Bilateral, 2/24/2017).    SOCIAL HISTORY  Social History   Substance Use Topics   • Smoking status: Light Tobacco Smoker -- 0.10 packs/day for 6 years     Types: Cigarettes     Last Attempt to Quit: 12/01/2014   • Smokeless tobacco: Never Used      Comment: 1 cig per day   • Alcohol Use: No      Comment: 1-2 per month     Social History     Social History Narrative    Lives with  and 2 sons.     Work: stay at home mom     FAMILY  HISTORY  Family History   Problem Relation Age of Onset   • Hypertension Mother    • Hyperlipidemia Mother    • Cancer Mother      breast   • Cancer Maternal Grandmother      breast   • Cancer Maternal Grandfather      leukemia   • Diabetes Father    • Diabetes Paternal Aunt    • Heart Disease Neg Hx    • Stroke Neg Hx    • Thyroid Paternal Grandmother    • Psychiatry Mother      Depression   • Psychiatry Sister      Panic attacks     Family Status   Relation Status Death Age   • Mother Alive    • Father Alive        ROS   Constitutional: Negative for fever, chills.  HENT: Negative for congestion, sore throat.  Eyes: Negative for blurred vision.   Respiratory: Negative for cough, shortness of breath.  Cardiovascular: Negative for chest pain, palpitations.   Gastrointestinal: Negative for heartburn, nausea, abdominal pain.   Genitourinary: Negative for dysuria.  Musculoskeletal: Negative for significant myalgias, back pain and joint pain.   Skin: Negative for rash and itching.   Neuro: Negative for dizziness, weakness and headaches.   Endo/Heme/Allergies: Does not bruise/bleed easily. And per HPI.  Psychiatric/Behavioral: As above.    PHYSICAL EXAM   Blood pressure 120/66, pulse 82, temperature 36.7 °C (98.1 °F), height 1.524 m (5'), weight 64.32 kg (141 lb 12.8 oz), SpO2 94 %. Body mass index is 27.69 kg/(m^2).  General:  NAD, well appearing  HEENT:   NC/AT, PERRLA, EOMI, TMs are clear. Oropharyngeal mucosa is pink,  without lesions;  no cervical / supraclavicular  lymphadenopathy, no thyromegaly.    Cardiovascular: RRR.   No m/r/g. No carotid bruits .      Lungs:   CTAB, no w/r/r, no respiratory distress.  Abdomen: Soft, NT/ND + BS; no suprapubic tenderness; no masses or hepatosplenomegaly.  Extremities:  2+ DP and radial pulses bilaterally.  No c/c/e.   Skin:  Warm, dry.  No erythema. No rash.   Neurologic: Alert & oriented x 3. No focal deficits.  Psychiatric:  Affect normal, mood normal, judgment normal.    LABS      Labs are reviewed and discussed with a patient     Ref. Range 11/4/2015 11:39 5/17/2016 10:54 1/7/2017 08:45   TSH  0.300-3.700 uIU/mL 0.020 (L)  0.960   Free T-4  0.53-1.43 ng/dL 1.55 (H) 1.09 0.50 (L)     IMAGING     None    ASSESMENT AND PLAN        1. Major depressive disorder, recurrent, moderate (CMS-HCC)  - REFERRAL TO PSYCHIATRY  2. VALENTE (generalized anxiety disorder)  - REFERRAL TO PSYCHIATRY  3. Panic attacks  - REFERRAL TO PSYCHIATRY  4. Bipolar disorder, current episode mixed, moderate (CMS-HCC)  - REFERRAL TO PSYCHIATRY  5. ADD (attention deficit disorder)  Continue current treatment, referral to psychiatry  - REFERRAL TO PSYCHIATRY    6. Congenital hypothyroidism without goiter  Pending TFT, continue current dose of levothyroxine    7. Health care maintenance  Pap smear was done in 2016, pending records    Counseling:   - Smoking:  Nonsmoker    Followup: Return in about 3 months (around 11/1/2017) for Short.    All questions are answered.    Time spent 40 minutes face to face, with > 50% spent counseling and coordinating care.  High complexity.      Please note that this dictation was created using voice recognition software, and that there might be errors of merry and possibly content.

## 2019-11-19 ENCOUNTER — HOSPITAL ENCOUNTER (OUTPATIENT)
Dept: LAB | Facility: MEDICAL CENTER | Age: 34
End: 2019-11-19
Attending: INTERNAL MEDICINE | Admitting: INTERNAL MEDICINE
Payer: COMMERCIAL

## 2019-11-19 PROCEDURE — 85652 RBC SED RATE AUTOMATED: CPT

## 2019-11-19 PROCEDURE — 36415 COLL VENOUS BLD VENIPUNCTURE: CPT

## 2019-11-20 LAB — ERYTHROCYTE [SEDIMENTATION RATE] IN BLOOD BY WESTERGREN METHOD: 18 MM/HOUR (ref 0–20)

## 2019-11-22 ENCOUNTER — HOSPITAL ENCOUNTER (EMERGENCY)
Facility: MEDICAL CENTER | Age: 34
End: 2019-11-22
Attending: EMERGENCY MEDICINE
Payer: COMMERCIAL

## 2019-11-22 VITALS
RESPIRATION RATE: 16 BRPM | WEIGHT: 152.34 LBS | SYSTOLIC BLOOD PRESSURE: 109 MMHG | BODY MASS INDEX: 29.91 KG/M2 | HEART RATE: 71 BPM | OXYGEN SATURATION: 98 % | HEIGHT: 60 IN | DIASTOLIC BLOOD PRESSURE: 73 MMHG | TEMPERATURE: 98 F

## 2019-11-22 DIAGNOSIS — M54.9 PAIN, UPPER BACK: ICD-10-CM

## 2019-11-22 LAB
ALBUMIN SERPL BCP-MCNC: 4 G/DL (ref 3.2–4.9)
ALBUMIN/GLOB SERPL: 1.1 G/DL
ALP SERPL-CCNC: 80 U/L (ref 30–99)
ALT SERPL-CCNC: 20 U/L (ref 2–50)
ANION GAP SERPL CALC-SCNC: 13 MMOL/L (ref 0–11.9)
ANISOCYTOSIS BLD QL SMEAR: ABNORMAL
APPEARANCE UR: CLEAR
AST SERPL-CCNC: 26 U/L (ref 12–45)
BASOPHILS # BLD AUTO: 1 % (ref 0–1.8)
BASOPHILS # BLD: 0.1 K/UL (ref 0–0.12)
BILIRUB SERPL-MCNC: <0.2 MG/DL (ref 0.1–1.5)
BILIRUB UR QL STRIP.AUTO: NEGATIVE
BUN SERPL-MCNC: 23 MG/DL (ref 8–22)
CALCIUM SERPL-MCNC: 9.5 MG/DL (ref 8.4–10.2)
CHLORIDE SERPL-SCNC: 102 MMOL/L (ref 96–112)
CO2 SERPL-SCNC: 23 MMOL/L (ref 20–33)
COLOR UR: YELLOW
COMMENT 1642: NORMAL
CREAT SERPL-MCNC: 0.8 MG/DL (ref 0.5–1.4)
CRP SERPL HS-MCNC: 0.04 MG/DL (ref 0–0.75)
DACRYOCYTES BLD QL SMEAR: NORMAL
EOSINOPHIL # BLD AUTO: 0.24 K/UL (ref 0–0.51)
EOSINOPHIL NFR BLD: 2.3 % (ref 0–6.9)
ERYTHROCYTE [SEDIMENTATION RATE] IN BLOOD BY WESTERGREN METHOD: 5 MM/HOUR (ref 0–20)
GLOBULIN SER CALC-MCNC: 3.7 G/DL (ref 1.9–3.5)
GLUCOSE SERPL-MCNC: 91 MG/DL (ref 65–99)
GLUCOSE UR STRIP.AUTO-MCNC: NEGATIVE MG/DL
HCG UR QL: NEGATIVE
HCT VFR BLD AUTO: 41.2 % (ref 37–47)
HGB BLD-MCNC: 12.9 G/DL (ref 12–16)
HYPOCHROMIA BLD QL SMEAR: ABNORMAL
IMM GRANULOCYTES # BLD AUTO: 0.04 K/UL (ref 0–0.11)
IMM GRANULOCYTES NFR BLD AUTO: 0.4 % (ref 0–0.9)
KETONES UR STRIP.AUTO-MCNC: NEGATIVE MG/DL
LEUKOCYTE ESTERASE UR QL STRIP.AUTO: NEGATIVE
LG PLATELETS BLD QL SMEAR: NORMAL
LIPASE SERPL-CCNC: 44 U/L (ref 7–58)
LYMPHOCYTES # BLD AUTO: 2.85 K/UL (ref 1–4.8)
LYMPHOCYTES NFR BLD: 27.6 % (ref 22–41)
MCH RBC QN AUTO: 26.2 PG (ref 27–33)
MCHC RBC AUTO-ENTMCNC: 31.3 G/DL (ref 33.6–35)
MCV RBC AUTO: 83.7 FL (ref 81.4–97.8)
MICRO URNS: NORMAL
MICROCYTES BLD QL SMEAR: ABNORMAL
MONOCYTES # BLD AUTO: 0.49 K/UL (ref 0–0.85)
MONOCYTES NFR BLD AUTO: 4.7 % (ref 0–13.4)
NEUTROPHILS # BLD AUTO: 6.6 K/UL (ref 2–7.15)
NEUTROPHILS NFR BLD: 64 % (ref 44–72)
NITRITE UR QL STRIP.AUTO: NEGATIVE
NRBC # BLD AUTO: 0 K/UL
NRBC BLD-RTO: 0 /100 WBC
OVALOCYTES BLD QL SMEAR: NORMAL
PH UR STRIP.AUTO: 6 [PH] (ref 5–8)
PLATELET # BLD AUTO: 292 K/UL (ref 164–446)
PLATELET BLD QL SMEAR: NORMAL
PMV BLD AUTO: 10.4 FL (ref 9–12.9)
POIKILOCYTOSIS BLD QL SMEAR: NORMAL
POTASSIUM SERPL-SCNC: 4.3 MMOL/L (ref 3.6–5.5)
PROT SERPL-MCNC: 7.7 G/DL (ref 6–8.2)
PROT UR QL STRIP: NEGATIVE MG/DL
RBC # BLD AUTO: 4.92 M/UL (ref 4.2–5.4)
RBC BLD AUTO: PRESENT
RBC UR QL AUTO: NEGATIVE
SODIUM SERPL-SCNC: 138 MMOL/L (ref 135–145)
SP GR UR REFRACTOMETRY: 1.02
WBC # BLD AUTO: 10.3 K/UL (ref 4.8–10.8)

## 2019-11-22 PROCEDURE — 86140 C-REACTIVE PROTEIN: CPT

## 2019-11-22 PROCEDURE — 81003 URINALYSIS AUTO W/O SCOPE: CPT

## 2019-11-22 PROCEDURE — 99284 EMERGENCY DEPT VISIT MOD MDM: CPT

## 2019-11-22 PROCEDURE — 81025 URINE PREGNANCY TEST: CPT

## 2019-11-22 PROCEDURE — 83690 ASSAY OF LIPASE: CPT

## 2019-11-22 PROCEDURE — 85025 COMPLETE CBC W/AUTO DIFF WBC: CPT

## 2019-11-22 PROCEDURE — 80053 COMPREHEN METABOLIC PANEL: CPT

## 2019-11-22 PROCEDURE — 700111 HCHG RX REV CODE 636 W/ 250 OVERRIDE (IP): Performed by: EMERGENCY MEDICINE

## 2019-11-22 PROCEDURE — 96374 THER/PROPH/DIAG INJ IV PUSH: CPT

## 2019-11-22 PROCEDURE — A9270 NON-COVERED ITEM OR SERVICE: HCPCS | Performed by: EMERGENCY MEDICINE

## 2019-11-22 PROCEDURE — 700102 HCHG RX REV CODE 250 W/ 637 OVERRIDE(OP): Performed by: EMERGENCY MEDICINE

## 2019-11-22 PROCEDURE — 96375 TX/PRO/DX INJ NEW DRUG ADDON: CPT

## 2019-11-22 PROCEDURE — 85652 RBC SED RATE AUTOMATED: CPT

## 2019-11-22 PROCEDURE — 36415 COLL VENOUS BLD VENIPUNCTURE: CPT

## 2019-11-22 RX ORDER — CYCLOBENZAPRINE HCL 10 MG
10 TABLET ORAL ONCE
Status: COMPLETED | OUTPATIENT
Start: 2019-11-22 | End: 2019-11-22

## 2019-11-22 RX ORDER — METHYLPREDNISOLONE 4 MG/1
TABLET ORAL
Qty: 1 PACKAGE | Refills: 0 | Status: SHIPPED | OUTPATIENT
Start: 2019-11-22 | End: 2020-08-05

## 2019-11-22 RX ORDER — DEXAMETHASONE SODIUM PHOSPHATE 4 MG/ML
8 INJECTION, SOLUTION INTRA-ARTICULAR; INTRALESIONAL; INTRAMUSCULAR; INTRAVENOUS; SOFT TISSUE ONCE
Status: COMPLETED | OUTPATIENT
Start: 2019-11-22 | End: 2019-11-22

## 2019-11-22 RX ORDER — CYCLOBENZAPRINE HCL 5 MG
5-10 TABLET ORAL 3 TIMES DAILY PRN
Qty: 30 TAB | Refills: 0 | Status: SHIPPED | OUTPATIENT
Start: 2019-11-22 | End: 2020-08-05

## 2019-11-22 RX ORDER — KETOROLAC TROMETHAMINE 30 MG/ML
30 INJECTION, SOLUTION INTRAMUSCULAR; INTRAVENOUS ONCE
Status: COMPLETED | OUTPATIENT
Start: 2019-11-22 | End: 2019-11-22

## 2019-11-22 RX ADMIN — KETOROLAC TROMETHAMINE 30 MG: 30 INJECTION, SOLUTION INTRAMUSCULAR at 12:50

## 2019-11-22 RX ADMIN — CYCLOBENZAPRINE HYDROCHLORIDE 10 MG: 10 TABLET, FILM COATED ORAL at 13:36

## 2019-11-22 RX ADMIN — DEXAMETHASONE SODIUM PHOSPHATE 8 MG: 4 INJECTION, SOLUTION INTRA-ARTICULAR; INTRALESIONAL; INTRAMUSCULAR; INTRAVENOUS; SOFT TISSUE at 12:08

## 2019-11-22 NOTE — ED PROVIDER NOTES
"ED Provider Note    CHIEF COMPLAINT  Chief Complaint   Patient presents with   • Back Pain     onset last night Pt being evaluated  for \" Int inflamatory illness \" not yet dx \" This is a flare of same \"   • Neck Pain   • Fever     T-max 101.5       HPI  Isabel Curry is a 34 y.o. female who presents to the emergency department complaint of back pain, neck pain, subjective fever.  She states that she has an inflammatory illness that she has been seeing a doctor in [place] for and was given steroids.  She was tested for rheumatoid arthritis but was negative and the doctor was concerned that she has an autoimmune abnormality.  She states her back pain started earlier this week and upper thoracic region was dull in nature, there is no alleviating exacerbating factors, denies shortness of breath, cough, nausea, vomiting, loss of sensation or strength in arms or legs.  She also states that she had bruising on her lower extremities bilaterally 2 weeks ago that dissipated after taking steroids.  She denies dysuria, hematochezia, melena, hematemesis, neck pain, complains of slight headache but does have a history headaches in the past and states that the same with no new factors contribute to her headache.  In addition, she states that she had extremely elevated CRP and white blood cell count 2 weeks ago when she was seen in [place].  REVIEW OF SYSTEMS  Positives as above. Pertinent negatives include All other review of systems are negative    PAST MEDICAL HISTORY  Past Medical History:   Diagnosis Date   • ADD (attention deficit disorder)    • ADHD     Pt states high functioning   • Bipolar affective (HCC)    • Depression    • VALENTE (generalized anxiety disorder)    • H/O gastric bypass    • History of anemia    • Hypothyroidism     congenital- Hypothyroid   • Major depression        FAMILY HISTORY  Noncontributory    SOCIAL HISTORY  Social History     Socioeconomic History   • Marital status:      Spouse " name: Not on file   • Number of children: Not on file   • Years of education: Not on file   • Highest education level: Not on file   Occupational History   • Not on file   Social Needs   • Financial resource strain: Not on file   • Food insecurity:     Worry: Not on file     Inability: Not on file   • Transportation needs:     Medical: Not on file     Non-medical: Not on file   Tobacco Use   • Smoking status: Light Tobacco Smoker     Packs/day: 0.10     Years: 6.00     Pack years: 0.60     Types: Cigarettes     Last attempt to quit: 2014     Years since quittin.9   • Smokeless tobacco: Never Used   • Tobacco comment: 1 cig per day   Substance and Sexual Activity   • Alcohol use: No     Alcohol/week: 0.0 oz     Comment: 1-2 per month   • Drug use: No   • Sexual activity: Not on file   Lifestyle   • Physical activity:     Days per week: Not on file     Minutes per session: Not on file   • Stress: Not on file   Relationships   • Social connections:     Talks on phone: Not on file     Gets together: Not on file     Attends Mu-ism service: Not on file     Active member of club or organization: Not on file     Attends meetings of clubs or organizations: Not on file     Relationship status: Not on file   • Intimate partner violence:     Fear of current or ex partner: Not on file     Emotionally abused: Not on file     Physically abused: Not on file     Forced sexual activity: Not on file   Other Topics Concern   • Not on file   Social History Narrative    Lives with  and 2 sons.     Work: stay at home mom       SURGICAL HISTORY  Past Surgical History:   Procedure Laterality Date   • MAMMOPLASTY AUGMENTATION Bilateral 2017    Procedure: MAMMOPLASTY AUGMENTATION;  Surgeon: Isaias Mix M.D.;  Location: SURGERY [facility];  Service:    • ABDOMINOPLASTY  3/21/2016   • PRIMARY C SECTION  8/3/2015       • GASTRIC BYPASS LAPAROSCOPIC     • PRIMARY C SECTION  2011            CURRENT MEDICATIONS  Home Medications    **Home medications have not yet been reviewed for this encounter**         ALLERGIES  No Known Allergies    PHYSICAL EXAM  VITAL SIGNS: /73   Pulse 71   Temp 36.7 °C (98 °F) (Temporal)   Resp 16   Ht 1.524 m (5')   Wt 69.1 kg (152 lb 5.4 oz)   SpO2 98%   BMI 29.75 kg/m²    Constitutional: Well developed, Well nourished, No acute distress, Non-toxic appearance.   Eyes: PERRLA, EOMI, Conjunctiva normal, No discharge.   Neck: No meningeal signs, no lymphadenopathy  Cardiovascular: Normal heart rate, Normal rhythm, No murmurs, No rubs, No gallops, and intact distal pulses.   Thorax & Lungs:  No respiratory distress, no rales, no rhonchi, No wheezing, No chest wall tenderness.   Abdomen: Bowel sounds normal, Soft, No tenderness, No guarding, No rebound, No pulsatile masses.   Skin: No petechia, purpura warm, Dry, No erythema, No rash.   Extremities: Full range of motion, no deformity, no edema.  Neurologic:  Alert & oriented to month and age, Normal cognition, Cranial nerves II-XII are intact, No slurred speech, Negative finger to nose bilaterally, No pronator drift bilaterally,   strength 5/5 bilaterally, Leg raise strength 5/5 bilaterally, Plantarflexion strength 5/5 bilaterally, Dorsiflexion strength 5/5 bilaterally, Deep tendon reflexes 2/4 upper and lower extremities bilaterally, Sensation intact throughout, No Nystagmus.  Psychiatric: Affect normal for clinical presentation.    Results for orders placed or performed during the hospital encounter of 11/22/19   CBC WITH DIFFERENTIAL   Result Value Ref Range    WBC 10.3 4.8 - 10.8 K/uL    RBC 4.92 4.20 - 5.40 M/uL    Hemoglobin 12.9 12.0 - 16.0 g/dL    Hematocrit 41.2 37.0 - 47.0 %    MCV 83.7 81.4 - 97.8 fL    MCH 26.2 (L) 27.0 - 33.0 pg    MCHC 31.3 (L) 33.6 - 35.0 g/dL    Platelet Count 292 164 - 446 K/uL    MPV 10.4 9.0 - 12.9 fL    Neutrophils-Polys 64.00 44.00 - 72.00 %    Lymphocytes 27.60 22.00 -  41.00 %    Monocytes 4.70 0.00 - 13.40 %    Eosinophils 2.30 0.00 - 6.90 %    Basophils 1.00 0.00 - 1.80 %    Immature Granulocytes 0.40 0.00 - 0.90 %    Nucleated RBC 0.00 /100 WBC    Neutrophils (Absolute) 6.60 2.00 - 7.15 K/uL    Lymphs (Absolute) 2.85 1.00 - 4.80 K/uL    Monos (Absolute) 0.49 0.00 - 0.85 K/uL    Eos (Absolute) 0.24 0.00 - 0.51 K/uL    Baso (Absolute) 0.10 0.00 - 0.12 K/uL    Immature Granulocytes (abs) 0.04 0.00 - 0.11 K/uL    NRBC (Absolute) 0.00 K/uL    Hypochromia 1+     Anisocytosis 1+     Microcytosis 1+    COMP METABOLIC PANEL   Result Value Ref Range    Sodium 138 135 - 145 mmol/L    Potassium 4.3 3.6 - 5.5 mmol/L    Chloride 102 96 - 112 mmol/L    Co2 23 20 - 33 mmol/L    Anion Gap 13.0 (H) 0.0 - 11.9    Glucose 91 65 - 99 mg/dL    Bun 23 (H) 8 - 22 mg/dL    Creatinine 0.80 0.50 - 1.40 mg/dL    Calcium 9.5 8.4 - 10.2 mg/dL    AST(SGOT) 26 12 - 45 U/L    ALT(SGPT) 20 2 - 50 U/L    Alkaline Phosphatase 80 30 - 99 U/L    Total Bilirubin <0.2 0.1 - 1.5 mg/dL    Albumin 4.0 3.2 - 4.9 g/dL    Total Protein 7.7 6.0 - 8.2 g/dL    Globulin 3.7 (H) 1.9 - 3.5 g/dL    A-G Ratio 1.1 g/dL   WESTERGREN SED RATE   Result Value Ref Range    Sed Rate Westergren 5 0 - 20 mm/hour   CRP QUANTITIVE (NON-CARDIAC)   Result Value Ref Range    Stat C-Reactive Protein 0.04 0.00 - 0.75 mg/dL   LIPASE   Result Value Ref Range    Lipase 44 7 - 58 U/L   BETA-HCG QUALITATIVE URINE   Result Value Ref Range    Beta-Hcg Urine Negative Negative   URINALYSIS,CULTURE IF INDICATED   Result Value Ref Range    Color Yellow     Character Clear     Ph 6.0 5.0 - 8.0    Glucose Negative Negative mg/dL    Ketones Negative Negative mg/dL    Protein Negative Negative mg/dL    Bilirubin Negative Negative    Nitrite Negative Negative    Leukocyte Esterase Negative Negative    Occult Blood Negative Negative    Micro Urine Req see below    REFRACTOMETER SG   Result Value Ref Range    Specific Gravity 1.020    PLATELET ESTIMATE   Result  Value Ref Range    Plt Estimation Normal    MORPHOLOGY   Result Value Ref Range    RBC Morphology Present     Large Platelets 1+     Poikilocytosis 1+     Ovalocytes 1+     Tear Drop Cells 1+    DIFFERENTIAL COMMENT   Result Value Ref Range    Comments-Diff see below    ESTIMATED GFR   Result Value Ref Range    GFR If African American >60 >60 mL/min/1.73 m 2    GFR If Non African American >60 >60 mL/min/1.73 m 2         COURSE & MEDICAL DECISION MAKING  Pertinent Labs & Imaging studies reviewed. (See chart for details)  This is a pleasant 34-year-old female presents with multiple complaints.  As for her back pain, the patient has no focal neurological deficits, she has no central spinous process tenderness, do not suspect a significant lesion L she has muscle spasm.  The patient does not have an elevated white blood cell count, she has a normal CRP and ESR and do not believe she has an infectious etiology to her complaint.  I do not believe she has encephalitis, meningitis or other significant intracranial abnormality.  In addition urinalysis is negative.  The IV is established, she received 1 L normal saline IV fluid secondary to her tachycardia and on reevaluation respond appropriate with a normal heart rate.  Initially she received Decadron 8 mg IV Toradol 30 mg IV, Zofran 4 mg IV and cyclobenzaprine 10 mg orally.  On reevaluation she states she is feeling much better.  The patient is to follow-up with her primary care physician for further evaluation management I requested that she follow-up with rheumatologist as well for possible autoimmune abnormality.  On reevaluation she has no focal neurological deficits, she has no condition is amenable to hospitalization, I do believe she is to be an adequate candidate for outpatient management.    Discharge Medication List as of 11/22/2019  1:54 PM      START taking these medications    Details   methylPREDNISolone (MEDROL DOSEPAK) 4 MG Tablet Therapy Pack Use as  directed, Disp-1 Package, R-0, Print Rx Paper      cyclobenzaprine (FLEXERIL) 5 MG tablet Take 1-2 Tabs by mouth 3 times a day as needed., Disp-30 Tab, R-0, Print Rx Paper             FINAL IMPRESSION     1. Pain, upper back Active       DISPOSITION:  Patient will be discharged home in stable condition.    FOLLOW UP:  [facility], Emergency Dept  [address]  [place] [id number] [phone]    If symptoms worsen    Alfred Gonzalez M.D.  [address]  [place] [id number] [phone]    Schedule an appointment as soon as possible for a visit         OUTPATIENT MEDICATIONS:  Discharge Medication List as of 11/22/2019  1:54 PM      START taking these medications    Details   methylPREDNISolone (MEDROL DOSEPAK) 4 MG Tablet Therapy Pack Use as directed, Disp-1 Package, R-0, Print Rx Paper      cyclobenzaprine (FLEXERIL) 5 MG tablet Take 1-2 Tabs by mouth 3 times a day as needed., Disp-30 Tab, R-0, Print Rx Paper               Electronically signed by: Yoni Mcfarland, 11/22/2019 11:56 AM

## 2019-11-25 ENCOUNTER — PATIENT MESSAGE (OUTPATIENT)
Dept: MEDICAL GROUP | Age: 34
End: 2019-11-25

## 2019-11-25 DIAGNOSIS — M25.50 POLYARTHRALGIA: ICD-10-CM

## 2019-11-25 NOTE — PATIENT COMMUNICATION
1. Caller Name: Isabel Curry  Call Back Number: [phone] (home)         Patient approves a detailed voicemail message: N\A    2. SPECIFIC Action To Be Taken: Referral pending, please sign.    3. Diagnosis/Clinical Reason for Request: Lab results    4. Specialty & Provider Name/Lab/Imaging Location: Rheumatology     5. Is appointment scheduled for requested order/referral: no    Patient was informed they will receive a return phone call from the office ONLY if there are any questions before processing their request. Advised to call back if they haven't received a call from the referral department in 5 days.

## 2019-12-01 NOTE — PATIENT COMMUNICATION
"Referral to rheumatologist done for diagnosis of \"polyarthralgia\".  This can be taken and used to see any rheumatologist wherever she wants including [facility], but not in the [facility] area, since none are taking new patients at this time..      It is not clear to me why this could not have been done by her primary care physician in [facility] where she was last seen, unless the patient is still living in the [facility], and was just visiting temporarily to [place].    Nevertheless there are no rheumatologists taking any new patients in the [facility] area at this time, and there will not be for the next several months, nor until any new rheumatologists start practicing in this area..    And finally I would encourage her to follow-up with the last PCP she saw in [place] since that physician's note indicated that all of rheumatology tests were essentially unremarkable, and that the patient was getting better with steroids (resolution of her rash and joint pains).    She also needs to remember to stay on iron and B12 indefinitely, forever in view of her prior gastric surgery which can impair iron and B12 absorption which could cause joint and muscle pains.  "

## 2020-08-05 ENCOUNTER — APPOINTMENT (OUTPATIENT)
Dept: RADIOLOGY | Facility: MEDICAL CENTER | Age: 35
End: 2020-08-05
Attending: EMERGENCY MEDICINE

## 2020-08-05 ENCOUNTER — HOSPITAL ENCOUNTER (EMERGENCY)
Facility: MEDICAL CENTER | Age: 35
End: 2020-08-05
Attending: EMERGENCY MEDICINE

## 2020-08-05 VITALS
BODY MASS INDEX: 29.84 KG/M2 | OXYGEN SATURATION: 99 % | SYSTOLIC BLOOD PRESSURE: 133 MMHG | HEIGHT: 60 IN | WEIGHT: 152 LBS | RESPIRATION RATE: 16 BRPM | DIASTOLIC BLOOD PRESSURE: 76 MMHG | HEART RATE: 73 BPM | TEMPERATURE: 98.8 F

## 2020-08-05 DIAGNOSIS — T14.8XXA PUNCTURE WOUND: ICD-10-CM

## 2020-08-05 DIAGNOSIS — S90.852A FOREIGN BODY IN LEFT FOOT, INITIAL ENCOUNTER: ICD-10-CM

## 2020-08-05 PROCEDURE — 303485 HCHG DRESSING MEDIUM

## 2020-08-05 PROCEDURE — A9270 NON-COVERED ITEM OR SERVICE: HCPCS | Performed by: EMERGENCY MEDICINE

## 2020-08-05 PROCEDURE — 700102 HCHG RX REV CODE 250 W/ 637 OVERRIDE(OP): Performed by: EMERGENCY MEDICINE

## 2020-08-05 PROCEDURE — 73630 X-RAY EXAM OF FOOT: CPT | Mod: LT

## 2020-08-05 PROCEDURE — 73620 X-RAY EXAM OF FOOT: CPT | Mod: LT

## 2020-08-05 PROCEDURE — 99284 EMERGENCY DEPT VISIT MOD MDM: CPT

## 2020-08-05 PROCEDURE — 700101 HCHG RX REV CODE 250: Performed by: EMERGENCY MEDICINE

## 2020-08-05 PROCEDURE — 10120 INC&RMVL FB SUBQ TISS SMPL: CPT

## 2020-08-05 RX ORDER — HYDROCODONE BITARTRATE AND ACETAMINOPHEN 5; 325 MG/1; MG/1
1-2 TABLET ORAL EVERY 6 HOURS PRN
Qty: 12 TAB | Refills: 0 | Status: SHIPPED | OUTPATIENT
Start: 2020-08-05 | End: 2020-08-08

## 2020-08-05 RX ORDER — HYDROCODONE BITARTRATE AND ACETAMINOPHEN 5; 325 MG/1; MG/1
1 TABLET ORAL ONCE
Status: COMPLETED | OUTPATIENT
Start: 2020-08-05 | End: 2020-08-05

## 2020-08-05 RX ORDER — AMOXICILLIN AND CLAVULANATE POTASSIUM 875; 125 MG/1; MG/1
1 TABLET, FILM COATED ORAL ONCE
Status: COMPLETED | OUTPATIENT
Start: 2020-08-05 | End: 2020-08-05

## 2020-08-05 RX ORDER — AMOXICILLIN AND CLAVULANATE POTASSIUM 875; 125 MG/1; MG/1
1 TABLET, FILM COATED ORAL 2 TIMES DAILY
Qty: 14 TAB | Refills: 0 | Status: SHIPPED | OUTPATIENT
Start: 2020-08-05 | End: 2020-08-12

## 2020-08-05 RX ORDER — LIDOCAINE HYDROCHLORIDE 20 MG/ML
20 INJECTION, SOLUTION INFILTRATION; PERINEURAL ONCE
Status: COMPLETED | OUTPATIENT
Start: 2020-08-05 | End: 2020-08-05

## 2020-08-05 RX ORDER — LEVOTHYROXINE SODIUM 0.12 MG/1
250 TABLET ORAL
Status: SHIPPED | COMMUNITY
End: 2022-07-12 | Stop reason: SDUPTHER

## 2020-08-05 RX ADMIN — HYDROCODONE BITARTRATE AND ACETAMINOPHEN 1 TABLET: 5; 325 TABLET ORAL at 15:20

## 2020-08-05 RX ADMIN — HYDROCODONE BITARTRATE AND ACETAMINOPHEN 1 TABLET: 5; 325 TABLET ORAL at 17:16

## 2020-08-05 RX ADMIN — LIDOCAINE HYDROCHLORIDE 20 ML: 20 INJECTION, SOLUTION INFILTRATION; PERINEURAL at 16:30

## 2020-08-05 RX ADMIN — AMOXICILLIN AND CLAVULANATE POTASSIUM 1 TABLET: 875; 125 TABLET, FILM COATED ORAL at 15:21

## 2020-08-05 ASSESSMENT — FIBROSIS 4 INDEX: FIB4 SCORE: 0.66

## 2020-08-05 NOTE — ED NOTES
Pt sitting, appears to be comfortable, no signs of distress. punctured wound on bottom of left foot, swelling present no redness or drainage, CMS intact. Per pt she is up to date on tetanus shot.

## 2020-08-05 NOTE — ED TRIAGE NOTES
Bib self for above complaints.     Chief Complaint   Patient presents with   • Foot Laceration     pt stepped on sharp jagged dog bone yesterday causing puncture wound to bottom of left foot.      /72   Pulse 82   Temp 37.1 °C (98.8 °F) (Temporal)   Resp 16   Ht 1.524 m (5')   SpO2 97%   Breastfeeding No   BMI 29.75 kg/m²

## 2020-08-05 NOTE — ED PROVIDER NOTES
ED Provider Note    CHIEF COMPLAINT  Chief Complaint   Patient presents with   • Foot Laceration     pt stepped on sharp jagged dog bone yesterday causing puncture wound to bottom of left foot.        HPI  Isabel Curry is a 35 y.o. female who presents to the emergency department complaining of left foot pain.  The patient stepped on a broken dog bone that stuck in the bottom of her foot near the plantar aspect of the ball of her foot.  She deployed after this happened yesterday.  Since then she has had increased pain and swelling.  Unsure if she has  she has a persistent foreign body.  This is up-to-date.  No allergies to antibiotics.    REVIEW OF SYSTEMS  See HPI for further details. constitutional: No fevers or chills.    PAST MEDICAL HISTORY  Past Medical History:   Diagnosis Date   • ADD (attention deficit disorder)    • ADHD     Pt states high functioning   • Bipolar affective (HCC)    • Depression    • VALENTE (generalized anxiety disorder)    • H/O gastric bypass    • History of anemia    • Hypothyroidism     congenital- Hypothyroid   • Major depression        FAMILY HISTORY  Family History   Problem Relation Age of Onset   • Hypertension Mother    • Hyperlipidemia Mother    • Cancer Mother         breast   • Psychiatric Illness Mother         Depression   • Diabetes Father    • Cancer Maternal Grandmother         breast   • Cancer Maternal Grandfather         leukemia   • Diabetes Paternal Aunt    • Thyroid Paternal Grandmother    • Psychiatric Illness Sister         Panic attacks   • Heart Disease Neg Hx    • Stroke Neg Hx        SOCIAL HISTORY  Social History     Socioeconomic History   • Marital status:      Spouse name: Not on file   • Number of children: Not on file   • Years of education: Not on file   • Highest education level: Not on file   Occupational History   • Not on file   Social Needs   • Financial resource strain: Not on file   • Food insecurity     Worry: Not on file      Inability: Not on file   • Transportation needs     Medical: Not on file     Non-medical: Not on file   Tobacco Use   • Smoking status: Light Tobacco Smoker     Packs/day: 0.10     Years: 6.00     Pack years: 0.60     Types: Cigarettes     Last attempt to quit: 2014     Years since quittin.6   • Smokeless tobacco: Never Used   • Tobacco comment: 1 cig per day   Substance and Sexual Activity   • Alcohol use: No     Alcohol/week: 0.0 oz     Comment: 1-2 per month   • Drug use: No   • Sexual activity: Not on file   Lifestyle   • Physical activity     Days per week: Not on file     Minutes per session: Not on file   • Stress: Not on file   Relationships   • Social connections     Talks on phone: Not on file     Gets together: Not on file     Attends [employer] service: Not on file     Active member of club or organization: Not on file     Attends meetings of clubs or organizations: Not on file     Relationship status: Not on file   • Intimate partner violence     Fear of current or ex partner: Not on file     Emotionally abused: Not on file     Physically abused: Not on file     Forced sexual activity: Not on file   Other Topics Concern   • Not on file   Social History Narrative    Lives with  and 2 sons.     Work: stay at home mom       SURGICAL HISTORY  Past Surgical History:   Procedure Laterality Date   • MAMMOPLASTY AUGMENTATION Bilateral 2017    Procedure: MAMMOPLASTY AUGMENTATION;  Surgeon: Isaias Mix M.D.;  Location: SURGERY [facility];  Service:    • ABDOMINOPLASTY  3/21/2016   • PRIMARY C SECTION  8/3/2015       • GASTRIC BYPASS LAPAROSCOPIC     • PRIMARY C SECTION  2011           CURRENT MEDICATIONS  Home Medications    **Home medications have not yet been reviewed for this encounter**         ALLERGIES  No Known Allergies    PHYSICAL EXAM  VITAL SIGNS: /72   Pulse 82   Temp 37.1 °C (98.8 °F) (Temporal)   Resp 16   Ht 1.524 m (5')   SpO2  97%   Breastfeeding No   BMI 29.75 kg/m²    Constitutional: Well developed, Well nourished, No acute distress, Non-toxic appearance.   HENT: Normocephalic, Atraumatic  Musculoskeletal: Good range of motion in all major joints.  Left foot has a puncture and distally it is less than a centimeter.  Is diffusely indurated but not red or hot.  No purulent drainage.  Normal neurovascular examination.  Neurologic: Alert, No focal deficits noted.   Psychiatric: Affect normal      RADIOLOGY/PROCEDURES  DX-FOOT-2- LEFT   Final Result         No residual radiopaque foreign body identified.      DX-FOOT-COMPLETE 3+ LEFT   Final Result      [place] calcific foreign body present within the plantar soft tissues just lateral to the first MTP joint.          Procedure note, removal of foreign body  The patient is given some oral Norco.  He wants a foreign body still but actually area was anesthetized with total of 60 cc 1% lidocaine with epinephrine.    The wound had been prepped with Betadine and was reprepped after anesthesia and draped in a sterile fashion.  Using 11 blade the puncture wound is extended to about a 1.5 cm incision.  The patient's wound was explored and I identified the foreign bodies removed with a needle .    No complications.  Tolerated well.      COURSE & MEDICAL DECISION MAKING  Pertinent Labs & Imaging studies reviewed. (See chart for details)    Patient is given oral Norco and oral Augmentin.    Foreign body identified on x-ray and subsequently removed.  Repeat x-ray shows no residual foreign body.    Patient be discharged home with a physician for pain medication as well as for Augmentin.    Put a dressing on wound and will give her crutches.  She can stay off her foot.  She should return for more pain, swelling, redness fever or other concerns.    She should follow-up with your doctor.  She should rest and take a few days off work.  Her questions are answered she is agreeable to plan and discharged  in good condition.      Alfred Gonzalez M.D.  [address]  [facility] [id number] [phone]          In prescribing controlled substances to this patient, I certify that I have obtained and reviewed the medical history of Isabel Curry. I have also made a good theo effort to obtain applicable records from other providers who have treated the patient and records did not demonstrate any increased risk of substance abuse that would prevent me from prescribing controlled substances.     I have conducted a physical exam and documented it. I have reviewed Ms. Curry’s prescription history as maintained by the [place] Prescription Monitoring Program.     I have assessed the patient’s risk for abuse, dependency, and addiction using the validated Opioid Risk Tool available at https://www.mdcalc.com/zpdwts-nrmb-gnvb-ort-narcotic-abuse.     Given the above, I believe the benefits of controlled substance therapy outweigh the risks. The reasons for prescribing controlled substances include the patient has a documented allergy to alternatives to controlled substances. Accordingly, I have discussed the risk and benefits, treatment plan, and alternative therapies with the patient.         FINAL IMPRESSION  1. Puncture wound     2. Foreign body in left foot, initial encounter  HYDROcodone-acetaminophen (NORCO) 5-325 MG Tab per tablet       2.   3.         Electronically signed by: Johann Ortega M.D., 8/5/2020 2:58 PM

## 2020-08-06 NOTE — DISCHARGE INSTRUCTIONS
Take antibiotics as prescribed.  Rest keep your foot elevated and use crutches.  No driving on Norco.  Return for pain, swelling, redness, fever or other concerns.  Follow-up with your doctor.

## 2020-08-06 NOTE — ED NOTES
Discharge instructions given and discussed. RX for norco and augmentin given and pt educated. Pt educated to come back to ER for new or worsening symptoms and follow up with PCP as instructed. Pt verbalized understanding. VSS. Pt  Discharged in stable condition.

## 2022-04-07 ENCOUNTER — OFFICE VISIT (OUTPATIENT)
Dept: URGENT CARE | Facility: PHYSICIAN GROUP | Age: 37
End: 2022-04-07
Payer: COMMERCIAL

## 2022-04-07 VITALS
WEIGHT: 157 LBS | SYSTOLIC BLOOD PRESSURE: 124 MMHG | TEMPERATURE: 97.8 F | HEART RATE: 97 BPM | HEIGHT: 60 IN | OXYGEN SATURATION: 96 % | RESPIRATION RATE: 14 BRPM | BODY MASS INDEX: 30.82 KG/M2 | DIASTOLIC BLOOD PRESSURE: 80 MMHG

## 2022-04-07 DIAGNOSIS — J06.9 URI WITH COUGH AND CONGESTION: ICD-10-CM

## 2022-04-07 DIAGNOSIS — H92.03 OTALGIA OF BOTH EARS: ICD-10-CM

## 2022-04-07 DIAGNOSIS — R50.9 FEVER, UNSPECIFIED FEVER CAUSE: ICD-10-CM

## 2022-04-07 LAB
FLUAV+FLUBV AG SPEC QL IA: NEGATIVE
INT CON NEG: NORMAL
INT CON POS: NORMAL

## 2022-04-07 PROCEDURE — 99213 OFFICE O/P EST LOW 20 MIN: CPT | Performed by: PHYSICIAN ASSISTANT

## 2022-04-07 PROCEDURE — 87804 INFLUENZA ASSAY W/OPTIC: CPT | Performed by: PHYSICIAN ASSISTANT

## 2022-04-07 ASSESSMENT — ENCOUNTER SYMPTOMS
EYE DISCHARGE: 0
COUGH: 1
VOMITING: 0
EYE REDNESS: 0
NAUSEA: 0
HEADACHES: 1
FEVER: 1
SORE THROAT: 0
DIARRHEA: 0
SHORTNESS OF BREATH: 0
MYALGIAS: 0

## 2022-04-07 NOTE — PROGRESS NOTES
Subjective     Isabel Curry is a 36 y.o. female who presents with Otalgia (Began  night, Bilateral ear pain, fever of 100.2, fatigue, headaches)            Otalgia   There is pain in both (right > left) ears. This is a new problem. Episode onset: x 3 days ago. The problem occurs constantly. The problem has been unchanged. Maximum temperature: The patient reports an associated intermittent fever. The pain is moderate. Associated symptoms include coughing and headaches. Pertinent negatives include no diarrhea, ear discharge, rash, sore throat or vomiting. She has tried acetaminophen for the symptoms.     The patient reports no sick contacts.  She reports no known exposure to COVID-19.  The patient has not been vaccinated against COVID-19.    PMH:  has a past medical history of ADD (attention deficit disorder), ADHD, Bipolar affective (HCC), Depression, VALENTE (generalized anxiety disorder), H/O gastric bypass, History of anemia, Hypothyroidism, and Major depression.  MEDS:   Current Outpatient Medications:   •  levothyroxine (SYNTHROID) 125 MCG Tab, Take 250 mcg by mouth every morning on an empty stomach. 2 tablets= 250 mcg, Disp: , Rfl:   ALLERGIES: No Known Allergies  SURGHX:   Past Surgical History:   Procedure Laterality Date   • MAMMOPLASTY AUGMENTATION Bilateral 2017    Procedure: MAMMOPLASTY AUGMENTATION;  Surgeon: Isaias Mix M.D.;  Location: SURGERY [facility];  Service:    • ABDOMINOPLASTY  3/21/2016   • PRIMARY C SECTION  8/3/2015       • GASTRIC BYPASS LAPAROSCOPIC     • PRIMARY C SECTION  2011         SOCHX:  reports that she has been smoking cigarettes. She has a 0.60 pack-year smoking history. She has never used smokeless tobacco. She reports that she does not drink alcohol and does not use drugs.  FH: Family history was reviewed, no pertinent findings to report      Review of Systems   Constitutional: Positive for fever.   HENT: Positive for  congestion and ear pain. Negative for ear discharge and sore throat.    Eyes: Negative for discharge and redness.   Respiratory: Positive for cough. Negative for shortness of breath.    Cardiovascular: Negative for chest pain and leg swelling.   Gastrointestinal: Negative for diarrhea, nausea and vomiting.   Musculoskeletal: Negative for joint pain and myalgias.   Skin: Negative for rash.   Neurological: Positive for headaches.              Objective     /80 (BP Location: Left arm, Patient Position: Sitting, BP Cuff Size: Adult)   Pulse 97   Temp 36.6 °C (97.8 °F) (Temporal)   Resp 14   Ht 1.524 m (5')   Wt 71.2 kg (157 lb)   SpO2 96%   BMI 30.66 kg/m²      Physical Exam  Constitutional:       General: She is not in acute distress.     Appearance: Normal appearance. She is not ill-appearing.   HENT:      Head: Normocephalic and atraumatic.      Right Ear: Tympanic membrane, ear canal and external ear normal.      Left Ear: Tympanic membrane, ear canal and external ear normal.      Nose: Nose normal.      Mouth/Throat:      Mouth: Mucous membranes are moist.      Pharynx: Oropharynx is clear. No posterior oropharyngeal erythema.   Eyes:      Extraocular Movements: Extraocular movements intact.      Conjunctiva/sclera: Conjunctivae normal.   Cardiovascular:      Rate and Rhythm: Normal rate and regular rhythm.      Heart sounds: Normal heart sounds.   Pulmonary:      Effort: Pulmonary effort is normal. No respiratory distress.      Breath sounds: Normal breath sounds. No wheezing.   Musculoskeletal:         General: Normal range of motion.      Cervical back: Normal range of motion and neck supple.   Skin:     General: Skin is warm and dry.   Neurological:      Mental Status: She is alert and oriented to person, place, and time.               Progress:  POCT Influenza: NEGATIVE     The patient declined COVID-19 testing today in clinic.             Assessment & Plan          1. URI with cough and  congestion    2. Otalgia of both ears    3. Fever, unspecified fever cause  - POCT Influenza A/B    The patient's presenting symptoms and physical examinations are consistent with URI with associated cough and congestion.  The patient has been experiencing bilateral ear pain, as well as an intermittent fever.  The patient's physical exam today in clinic was normal.  The patient appears in no acute distress.  The patient's vital signs are stable and within normal limits.  She is afebrile today in clinic.  Reassured the patient that her bilateral TMs are clear without signs of infection.  Discussed likely viral etiology with the patient.  The patient's POCT influenza testing today in clinic was negative.  The patient declined COVID-19 testing at this time.  Advised the patient to monitor worsening signs and/or symptoms.  Recommend OTC medications and supportive care for symptomatic management.  Recommend patient follow-up with her PCP as needed.  Discussed return precautions with the patient, and she verbalized understanding.    Differential diagnoses, supportive care, and indications for immediate follow-up discussed with patient.   Instructed to return to clinic or nearest emergency department for any change in condition, further concerns, or worsening of symptoms.    OTC Tylenol or Motrin for fever/discomfort.  OTC cough/cold medication for symptomatic relief  OTC antihistamines for symptomatic relief  OTC Flonase for symptomatic relief  OTC Supportive Care for Congestion - saline nasal spray or neti pot  Drink plenty of fluids  Follow-up with PCP  Return to clinic or go to the ED if symptoms worsen or fail to improve, or if the patient should develop worsening/increasing cough, congestion, ear pain, sore throat, shortness of breath, wheezing, chest pain, fever/chills, and/or any concerning symptoms.    Discussed plan with the patient, and she agrees to the above.     I personally reviewed prior external notes and  test results pertinent to today's visit.  I have independently reviewed and interpreted all diagnostics ordered during this urgent care visit.     Please note that this dictation was created using voice recognition software. I have made every reasonable attempt to correct obvious errors, but I expect that there may be errors of grammar and possibly content that I did not discover before finalizing the note.     This note was electronically signed by Anisha Cerna PA-C

## 2022-06-13 ENCOUNTER — TELEPHONE (OUTPATIENT)
Dept: SCHEDULING | Facility: IMAGING CENTER | Age: 37
End: 2022-06-13

## 2022-07-12 ENCOUNTER — OFFICE VISIT (OUTPATIENT)
Dept: MEDICAL GROUP | Age: 37
End: 2022-07-12
Payer: COMMERCIAL

## 2022-07-12 ENCOUNTER — HOSPITAL ENCOUNTER (OUTPATIENT)
Dept: LAB | Facility: MEDICAL CENTER | Age: 37
End: 2022-07-12
Attending: PHYSICIAN ASSISTANT
Payer: COMMERCIAL

## 2022-07-12 VITALS
DIASTOLIC BLOOD PRESSURE: 58 MMHG | WEIGHT: 158 LBS | OXYGEN SATURATION: 100 % | RESPIRATION RATE: 16 BRPM | TEMPERATURE: 98.3 F | BODY MASS INDEX: 29.08 KG/M2 | HEART RATE: 86 BPM | SYSTOLIC BLOOD PRESSURE: 100 MMHG | HEIGHT: 62 IN

## 2022-07-12 DIAGNOSIS — E55.9 VITAMIN D DEFICIENCY: ICD-10-CM

## 2022-07-12 DIAGNOSIS — E03.1 CONGENITAL HYPOTHYROIDISM WITHOUT GOITER: ICD-10-CM

## 2022-07-12 DIAGNOSIS — Z98.84 S/P GASTRIC BYPASS: ICD-10-CM

## 2022-07-12 DIAGNOSIS — E53.8 VITAMIN B 12 DEFICIENCY: ICD-10-CM

## 2022-07-12 DIAGNOSIS — D50.8 IRON DEFICIENCY ANEMIA SECONDARY TO INADEQUATE DIETARY IRON INTAKE: ICD-10-CM

## 2022-07-12 DIAGNOSIS — Z76.89 ESTABLISHING CARE WITH NEW DOCTOR, ENCOUNTER FOR: ICD-10-CM

## 2022-07-12 DIAGNOSIS — Z00.00 BLOOD TESTS FOR ROUTINE GENERAL PHYSICAL EXAMINATION: ICD-10-CM

## 2022-07-12 PROBLEM — Z13.71 BRCA NEGATIVE: Status: ACTIVE | Noted: 2019-10-15

## 2022-07-12 PROBLEM — Z13.71 BRCA NEGATIVE: Status: RESOLVED | Noted: 2019-10-15 | Resolved: 2022-07-12

## 2022-07-12 PROBLEM — Z80.3 FAMILY HISTORY OF BREAST CANCER IN MOTHER: Status: ACTIVE | Noted: 2019-09-10

## 2022-07-12 PROBLEM — Z80.3 FAMILY HISTORY OF BREAST CANCER IN MOTHER: Status: RESOLVED | Noted: 2019-09-10 | Resolved: 2022-07-12

## 2022-07-12 LAB
25(OH)D3 SERPL-MCNC: 34 NG/ML (ref 30–100)
ALBUMIN SERPL BCP-MCNC: 4.5 G/DL (ref 3.2–4.9)
ALBUMIN/GLOB SERPL: 1.7 G/DL
ALP SERPL-CCNC: 58 U/L (ref 30–99)
ALT SERPL-CCNC: 17 U/L (ref 2–50)
ANION GAP SERPL CALC-SCNC: 9 MMOL/L (ref 7–16)
AST SERPL-CCNC: 18 U/L (ref 12–45)
BASOPHILS # BLD AUTO: 1.1 % (ref 0–1.8)
BASOPHILS # BLD: 0.07 K/UL (ref 0–0.12)
BILIRUB SERPL-MCNC: 0.4 MG/DL (ref 0.1–1.5)
BUN SERPL-MCNC: 13 MG/DL (ref 8–22)
CALCIUM SERPL-MCNC: 9.7 MG/DL (ref 8.5–10.5)
CHLORIDE SERPL-SCNC: 104 MMOL/L (ref 96–112)
CHOLEST SERPL-MCNC: 189 MG/DL (ref 100–199)
CO2 SERPL-SCNC: 24 MMOL/L (ref 20–33)
CREAT SERPL-MCNC: 0.67 MG/DL (ref 0.5–1.4)
EOSINOPHIL # BLD AUTO: 0.01 K/UL (ref 0–0.51)
EOSINOPHIL NFR BLD: 0.2 % (ref 0–6.9)
ERYTHROCYTE [DISTWIDTH] IN BLOOD BY AUTOMATED COUNT: 43.2 FL (ref 35.9–50)
FASTING STATUS PATIENT QL REPORTED: NORMAL
FERRITIN SERPL-MCNC: 76.8 NG/ML (ref 10–291)
GFR SERPLBLD CREATININE-BSD FMLA CKD-EPI: 117 ML/MIN/1.73 M 2
GLOBULIN SER CALC-MCNC: 2.7 G/DL (ref 1.9–3.5)
GLUCOSE SERPL-MCNC: 88 MG/DL (ref 65–99)
HCT VFR BLD AUTO: 40.4 % (ref 37–47)
HDLC SERPL-MCNC: 72 MG/DL
HGB BLD-MCNC: 12.8 G/DL (ref 12–16)
IMM GRANULOCYTES # BLD AUTO: 0 K/UL (ref 0–0.11)
IMM GRANULOCYTES NFR BLD AUTO: 0 % (ref 0–0.9)
IRON SATN MFR SERPL: 48 % (ref 15–55)
IRON SERPL-MCNC: 150 UG/DL (ref 40–170)
LDLC SERPL CALC-MCNC: 101 MG/DL
LYMPHOCYTES # BLD AUTO: 2.64 K/UL (ref 1–4.8)
LYMPHOCYTES NFR BLD: 40.2 % (ref 22–41)
MCH RBC QN AUTO: 29.6 PG (ref 27–33)
MCHC RBC AUTO-ENTMCNC: 31.7 G/DL (ref 33.6–35)
MCV RBC AUTO: 93.5 FL (ref 81.4–97.8)
MONOCYTES # BLD AUTO: 0.4 K/UL (ref 0–0.85)
MONOCYTES NFR BLD AUTO: 6.1 % (ref 0–13.4)
NEUTROPHILS # BLD AUTO: 3.45 K/UL (ref 2–7.15)
NEUTROPHILS NFR BLD: 52.4 % (ref 44–72)
NRBC # BLD AUTO: 0 K/UL
NRBC BLD-RTO: 0 /100 WBC
PLATELET # BLD AUTO: 285 K/UL (ref 164–446)
PMV BLD AUTO: 11.9 FL (ref 9–12.9)
POTASSIUM SERPL-SCNC: 4.4 MMOL/L (ref 3.6–5.5)
PROT SERPL-MCNC: 7.2 G/DL (ref 6–8.2)
RBC # BLD AUTO: 4.32 M/UL (ref 4.2–5.4)
SODIUM SERPL-SCNC: 137 MMOL/L (ref 135–145)
T4 FREE SERPL-MCNC: 2.48 NG/DL (ref 0.93–1.7)
TIBC SERPL-MCNC: 310 UG/DL (ref 250–450)
TRIGL SERPL-MCNC: 80 MG/DL (ref 0–149)
TSH SERPL DL<=0.005 MIU/L-ACNC: 0.02 UIU/ML (ref 0.38–5.33)
UIBC SERPL-MCNC: 160 UG/DL (ref 110–370)
VIT B12 SERPL-MCNC: 276 PG/ML (ref 211–911)
WBC # BLD AUTO: 6.6 K/UL (ref 4.8–10.8)

## 2022-07-12 PROCEDURE — 36415 COLL VENOUS BLD VENIPUNCTURE: CPT

## 2022-07-12 PROCEDURE — 82306 VITAMIN D 25 HYDROXY: CPT

## 2022-07-12 PROCEDURE — 84439 ASSAY OF FREE THYROXINE: CPT

## 2022-07-12 PROCEDURE — 85025 COMPLETE CBC W/AUTO DIFF WBC: CPT

## 2022-07-12 PROCEDURE — 83550 IRON BINDING TEST: CPT

## 2022-07-12 PROCEDURE — 84443 ASSAY THYROID STIM HORMONE: CPT

## 2022-07-12 PROCEDURE — 83540 ASSAY OF IRON: CPT

## 2022-07-12 PROCEDURE — 82607 VITAMIN B-12: CPT

## 2022-07-12 PROCEDURE — 80061 LIPID PANEL: CPT

## 2022-07-12 PROCEDURE — 82728 ASSAY OF FERRITIN: CPT

## 2022-07-12 PROCEDURE — 99214 OFFICE O/P EST MOD 30 MIN: CPT | Performed by: PHYSICIAN ASSISTANT

## 2022-07-12 PROCEDURE — 80053 COMPREHEN METABOLIC PANEL: CPT

## 2022-07-12 RX ORDER — LEVOTHYROXINE SODIUM 0.12 MG/1
250 TABLET ORAL
Qty: 90 TABLET | Refills: 3 | Status: SHIPPED | OUTPATIENT
Start: 2022-07-12 | End: 2022-07-14 | Stop reason: SDUPTHER

## 2022-07-12 ASSESSMENT — PATIENT HEALTH QUESTIONNAIRE - PHQ9: CLINICAL INTERPRETATION OF PHQ2 SCORE: 0

## 2022-07-12 NOTE — PROGRESS NOTES
"cc: Establish care    Subjective:     HPI  Isabel Curry is a 37 y.o. female presenting to establish care.  It has been about 3 years since she has been seen by primary care provider.  She works for [employer] as a .    She has history of congenital hypothyroidism, has been on supplementation since birth.  She has been on 250 MCG's of levothyroxine for the past 8 years.  It has been well over a year since she has had her thyroid level checked.  She has been noting more fatigue lately, hair loss, also notes some facial swelling-which is what of the symptoms she has when her thyroid levels are off.  She was previously followed by endocrine in [place].  Would like to reestablish with endocrinology here locally.    She also has history of iron deficiency secondary to inadequate intake after gastric bypass surgery.  Per patient she was also iron deficient prior to gastric bypass surgery.  She was initially started on oral supplementation, but due to gastric bypass has not been able to absorb the iron.  She was getting iron transfusions every 6 months.  Previously followed by hematology.  It has been over 3 years since she has had an iron fusion.  Again she is noting experience of fatigue, hair loss.      Review of systems:  See above.       Current Outpatient Medications:   •  levothyroxine (SYNTHROID) 125 MCG Tab, Take 2 Tablets by mouth every morning on an empty stomach. 2 tablets= 250 mcg, Disp: 90 Tablet, Rfl: 3    Allergies, past medical history, past surgical history, family history, social history reviewed and updated    Objective:     Vitals: /58 (BP Location: Left arm, Patient Position: Sitting, BP Cuff Size: Adult)   Pulse 86   Temp 36.8 °C (98.3 °F) (Temporal)   Resp 16   Ht 1.575 m (5' 2\")   Wt 71.7 kg (158 lb)   SpO2 100%   BMI 28.90 kg/m²   General: Alert, pleasant, NAD  HEENT: Normocephalic. Neck supple.  No thyromegaly or masses palpated. No cervical or supraclavicular " lymphadenopathy. No carotid bruits   Heart: Regular rate and rhythm.  S1 and S2 normal.  No murmurs appreciated.  Respiratory: Normal respiratory effort.  Clear to auscultation bilaterally.  Skin: Warm, dry, no rashes.  Extremities: No leg edema.  Radial pulses 2+ symmetric  Psych:  Affect/mood is normal, judgement is good, memory is intact, grooming is appropriate.    Assessment/Plan:     Isabel was seen today for establish care.    Diagnoses and all orders for this visit:    Congenital hypothyroidism without goiter  -Has been previously well controlled on 250 MCG's of levothyroxine.  She is symptomatic, monitor TSH levels are within normal limits.  Will adjust medication as needed pending results.  We will also place referral back to endocrinology.  -     TSH WITH REFLEX TO FT4; Future  -     Referral to Endocrinology  -     levothyroxine (SYNTHROID) 125 MCG Tab; Take 2 Tablets by mouth every morning on an empty stomach. 2 tablets= 250 mcg    Iron deficiency anemia secondary to inadequate dietary iron intake  -She is symptomatic.  We will check CBC and iron panel.  Oral iron has not been sufficient in the past due to poor absorption from gastric bypass.  If she is iron deficient we will plan on referral back to hematology and we will set her up for iron infusions.  -     CBC WITH DIFFERENTIAL; Future  -     IRON/TOTAL IRON BIND; Future  -     FERRITIN; Future    S/P gastric bypass  -We will check vitamin D and B12 levels.  She does take the supplements.  -     CBC WITH DIFFERENTIAL; Future  -     Comp Metabolic Panel; Future  -     VITAMIN D,25 HYDROXY; Future  -     VITAMIN B12; Future    Vitamin D deficiency  -See above  -     VITAMIN D,25 HYDROXY; Future    Vitamin B 12 deficiency  -See above  -     VITAMIN B12; Future    Blood tests for routine general physical examination  -     CBC WITH DIFFERENTIAL; Future  -     Comp Metabolic Panel; Future  -     Lipid Profile; Future  -     TSH WITH REFLEX TO FT4;  Future    Establishing care with new doctor, encounter for  -Previous records reviewed.      Return in about 3 weeks (around 8/2/2022) for Lab Review.

## 2022-07-14 DIAGNOSIS — E03.1 CONGENITAL HYPOTHYROIDISM WITHOUT GOITER: ICD-10-CM

## 2022-07-14 RX ORDER — LEVOTHYROXINE SODIUM 112 UG/1
224 TABLET ORAL
Qty: 180 TABLET | Refills: 3 | Status: SHIPPED | OUTPATIENT
Start: 2022-07-14 | End: 2022-09-08 | Stop reason: SDUPTHER

## 2022-07-26 ENCOUNTER — OFFICE VISIT (OUTPATIENT)
Dept: MEDICAL GROUP | Age: 37
End: 2022-07-26
Payer: COMMERCIAL

## 2022-07-26 VITALS
BODY MASS INDEX: 28.89 KG/M2 | SYSTOLIC BLOOD PRESSURE: 104 MMHG | RESPIRATION RATE: 16 BRPM | HEIGHT: 62 IN | TEMPERATURE: 98.6 F | HEART RATE: 68 BPM | WEIGHT: 157 LBS | OXYGEN SATURATION: 98 % | DIASTOLIC BLOOD PRESSURE: 62 MMHG

## 2022-07-26 DIAGNOSIS — E55.9 VITAMIN D DEFICIENCY: ICD-10-CM

## 2022-07-26 DIAGNOSIS — L40.9 PSORIASIS: ICD-10-CM

## 2022-07-26 DIAGNOSIS — D50.0 IRON DEFICIENCY ANEMIA DUE TO CHRONIC BLOOD LOSS: ICD-10-CM

## 2022-07-26 DIAGNOSIS — E03.1 CONGENITAL HYPOTHYROIDISM WITHOUT GOITER: ICD-10-CM

## 2022-07-26 PROCEDURE — 99214 OFFICE O/P EST MOD 30 MIN: CPT | Performed by: PHYSICIAN ASSISTANT

## 2022-07-26 RX ORDER — BETAMETHASONE DIPROPIONATE 0.5 MG/G
1 CREAM TOPICAL 2 TIMES DAILY
Qty: 15 G | Refills: 1 | Status: SHIPPED | OUTPATIENT
Start: 2022-07-26 | End: 2022-11-01

## 2022-07-26 ASSESSMENT — FIBROSIS 4 INDEX: FIB4 SCORE: 0.54

## 2022-07-26 NOTE — PROGRESS NOTES
cc: Lab review and skin lesion    Subjective:     HPI  Isabel Curry is a 37 y.o. female presenting for lab review.  She has prior history of fairly significant iron deficiency anemia, due to inadequate dietary iron intake from gastric bypass.  Her CBC is completely normal.  Iron levels within normal limits.  Thyroid levels were found to be low, elevated T4, new prescription of levothyroxine was sent into the pharmacy, however she has not started this yet.  She has been referred to endocrine, but has not been contacted.    She does have concerns of a skin lesion on the back of her right calf.  States its been there for about 2 weeks.  Is very itchy, red, flaky, well demarcated.  Has not tried any topicals on it.  No other similar lesions.     Latest Reference Range & Units 07/12/22 11:50   WBC 4.8 - 10.8 K/uL 6.6   RBC 4.20 - 5.40 M/uL 4.32   Hemoglobin 12.0 - 16.0 g/dL 12.8   Hematocrit 37.0 - 47.0 % 40.4   MCV 81.4 - 97.8 fL 93.5   MCH 27.0 - 33.0 pg 29.6   MCHC 33.6 - 35.0 g/dL 31.7 (L)   RDW 35.9 - 50.0 fL 43.2   Platelet Count 164 - 446 K/uL 285   MPV 9.0 - 12.9 fL 11.9   Neutrophils-Polys 44.00 - 72.00 % 52.40   Neutrophils (Absolute) 2.00 - 7.15 K/uL 3.45   Lymphocytes 22.00 - 41.00 % 40.20   Lymphs (Absolute) 1.00 - 4.80 K/uL 2.64   Monocytes 0.00 - 13.40 % 6.10   Monos (Absolute) 0.00 - 0.85 K/uL 0.40   Eosinophils 0.00 - 6.90 % 0.20   Eos (Absolute) 0.00 - 0.51 K/uL 0.01   Basophils 0.00 - 1.80 % 1.10   Baso (Absolute) 0.00 - 0.12 K/uL 0.07   Immature Granulocytes 0.00 - 0.90 % 0.00   Immature Granulocytes (abs) 0.00 - 0.11 K/uL 0.00   Nucleated RBC /100 WBC 0.00   NRBC (Absolute) K/uL 0.00   Sodium 135 - 145 mmol/L 137   Potassium 3.6 - 5.5 mmol/L 4.4   Chloride 96 - 112 mmol/L 104   Co2 20 - 33 mmol/L 24   Anion Gap 7.0 - 16.0  9.0   Glucose 65 - 99 mg/dL 88   Bun 8 - 22 mg/dL 13   Creatinine 0.50 - 1.40 mg/dL 0.67   GFR (CKD-EPI) >60 mL/min/1.73 m 2 117   Calcium 8.5 - 10.5 mg/dL 9.7   AST(SGOT)  "12 - 45 U/L 18   ALT(SGPT) 2 - 50 U/L 17   Alkaline Phosphatase 30 - 99 U/L 58   Total Bilirubin 0.1 - 1.5 mg/dL 0.4   Albumin 3.2 - 4.9 g/dL 4.5   Total Protein 6.0 - 8.2 g/dL 7.2   Globulin 1.9 - 3.5 g/dL 2.7   A-G Ratio g/dL 1.7   Iron 40 - 170 ug/dL 150   Total Iron Binding 250 - 450 ug/dL 310   % Saturation 15 - 55 % 48   Unsat Iron Binding 110 - 370 ug/dL 160   Cholesterol,Tot 100 - 199 mg/dL 189   Triglycerides 0 - 149 mg/dL 80   HDL >=40 mg/dL 72   LDL <100 mg/dL 101 (H)   25-Hydroxy   Vitamin D 25 30 - 100 ng/mL 34   Ferritin 10.0 - 291.0 ng/mL 76.8   Vitamin B12 -True Cobalamin 211 - 911 pg/mL 276   TSH 0.380 - 5.330 uIU/mL 0.020 (L)   Free T-4 0.93 - 1.70 ng/dL 2.48 (H)   (L): Data is abnormally low  (H): Data is abnormally high    Review of systems:  See above.       Current Outpatient Medications:   •  betamethasone dipropionate 0.05 % Cream, Apply 1 Application topically 2 times a day. Apply to affected area until resolved, do not use for longer than 2 weeks at a time., Disp: 15 g, Rfl: 1  •  levothyroxine (SYNTHROID) 112 MCG Tab, Take 2 Tablets by mouth every morning on an empty stomach., Disp: 180 Tablet, Rfl: 3    Allergies, past medical history, past surgical history, family history, social history reviewed and updated    Objective:     Vitals: /62 (BP Location: Left arm, Patient Position: Sitting, BP Cuff Size: Adult)   Pulse 68   Temp 37 °C (98.6 °F) (Temporal)   Resp 16   Ht 1.575 m (5' 2\")   Wt 71.2 kg (157 lb)   SpO2 98%   BMI 28.72 kg/m²   General: Alert, pleasant, NAD  HEENT: Normocephalic. Neck supple.  No thyromegaly or masses palpated. No cervical or supraclavicular lymphadenopathy. No carotid bruits   Heart: Regular rate and rhythm.  S1 and S2 normal.  No murmurs appreciated.  Respiratory: Normal respiratory effort.  Clear to auscultation bilaterally.  Skin: Warm, dry.  Erythematous, leaking/scaly, well demarcated patch on the upper portion of the right calf.  Extremities: " No leg edema.  Radial pulses 2+ symmetric  Psych:  Affect/mood is normal, judgement is good, memory is intact, grooming is appropriate.    Assessment/Plan:     Isabel was seen today for follow-up, other and ear drainage.    Diagnoses and all orders for this visit:    Congenital hypothyroidism without goiter  -TSH level is elevated.  Did discuss that we have been over treating her.  New prescription of levothyroxine  has already been sent to the pharmacy.  Advised to start taking 224 MCG's.  We will repeat TSH levels in 6 weeks.  Adjust medication as needed pending results.  Number for endocrine given advised to call and schedule appointment.  -     TSH; Future    Iron deficiency anemia due to chronic blood loss  -CBC and iron levels are all within normal limits.  No need for iron transfusion at this time.  We will continue monitor repeat labs again in 3 months to assess for stability.  -     CBC WITH DIFFERENTIAL; Future  -     IRON/TOTAL IRON BIND; Future  -     FERRITIN; Future    Psoriasis  -Discussed exam findings with patient.  Most consistent with psoriasis.  Given steroid cream to use.  Advised not use this for more than 2 weeks at a time.  -     betamethasone dipropionate 0.05 % Cream; Apply 1 Application topically 2 times a day. Apply to affected area until resolved, do not use for longer than 2 weeks at a time.    Vitamin D deficiency  -Stable.  Vitamin D within normal limits.  Continue with over-the-counter vitamin D.      Return in about 2 months (around 9/26/2022) for Annual PX.

## 2022-11-01 ENCOUNTER — HOSPITAL ENCOUNTER (OUTPATIENT)
Dept: LAB | Facility: MEDICAL CENTER | Age: 37
End: 2022-11-01
Attending: PHYSICIAN ASSISTANT
Payer: COMMERCIAL

## 2022-11-01 ENCOUNTER — OFFICE VISIT (OUTPATIENT)
Dept: MEDICAL GROUP | Age: 37
End: 2022-11-01
Payer: COMMERCIAL

## 2022-11-01 VITALS
HEART RATE: 83 BPM | HEIGHT: 62 IN | RESPIRATION RATE: 16 BRPM | TEMPERATURE: 96.4 F | BODY MASS INDEX: 28.12 KG/M2 | OXYGEN SATURATION: 96 % | WEIGHT: 152.8 LBS | SYSTOLIC BLOOD PRESSURE: 118 MMHG | DIASTOLIC BLOOD PRESSURE: 66 MMHG

## 2022-11-01 DIAGNOSIS — E03.1 CONGENITAL HYPOTHYROIDISM WITHOUT GOITER: ICD-10-CM

## 2022-11-01 DIAGNOSIS — D50.8 IRON DEFICIENCY ANEMIA SECONDARY TO INADEQUATE DIETARY IRON INTAKE: ICD-10-CM

## 2022-11-01 DIAGNOSIS — Z91.89 AT HIGH RISK FOR BREAST CANCER: ICD-10-CM

## 2022-11-01 DIAGNOSIS — Z00.00 WELL ADULT EXAM: ICD-10-CM

## 2022-11-01 DIAGNOSIS — D50.0 IRON DEFICIENCY ANEMIA DUE TO CHRONIC BLOOD LOSS: ICD-10-CM

## 2022-11-01 PROBLEM — R73.9 HYPERGLYCEMIA: Status: RESOLVED | Noted: 2019-08-28 | Resolved: 2022-11-01

## 2022-11-01 PROBLEM — R53.83 FATIGUE: Status: RESOLVED | Noted: 2019-08-28 | Resolved: 2022-11-01

## 2022-11-01 LAB
BASOPHILS # BLD AUTO: 1.3 % (ref 0–1.8)
BASOPHILS # BLD: 0.09 K/UL (ref 0–0.12)
EOSINOPHIL # BLD AUTO: 0.09 K/UL (ref 0–0.51)
EOSINOPHIL NFR BLD: 1.3 % (ref 0–6.9)
ERYTHROCYTE [DISTWIDTH] IN BLOOD BY AUTOMATED COUNT: 46.8 FL (ref 35.9–50)
FERRITIN SERPL-MCNC: 50.3 NG/ML (ref 10–291)
HCT VFR BLD AUTO: 39.8 % (ref 37–47)
HGB BLD-MCNC: 12.7 G/DL (ref 12–16)
IMM GRANULOCYTES # BLD AUTO: 0.01 K/UL (ref 0–0.11)
IMM GRANULOCYTES NFR BLD AUTO: 0.1 % (ref 0–0.9)
IRON SATN MFR SERPL: 54 % (ref 15–55)
IRON SERPL-MCNC: 161 UG/DL (ref 40–170)
LYMPHOCYTES # BLD AUTO: 2.52 K/UL (ref 1–4.8)
LYMPHOCYTES NFR BLD: 36.7 % (ref 22–41)
MCH RBC QN AUTO: 29.3 PG (ref 27–33)
MCHC RBC AUTO-ENTMCNC: 31.9 G/DL (ref 33.6–35)
MCV RBC AUTO: 91.7 FL (ref 81.4–97.8)
MONOCYTES # BLD AUTO: 0.48 K/UL (ref 0–0.85)
MONOCYTES NFR BLD AUTO: 7 % (ref 0–13.4)
NEUTROPHILS # BLD AUTO: 3.68 K/UL (ref 2–7.15)
NEUTROPHILS NFR BLD: 53.6 % (ref 44–72)
NRBC # BLD AUTO: 0 K/UL
NRBC BLD-RTO: 0 /100 WBC
PLATELET # BLD AUTO: 266 K/UL (ref 164–446)
PMV BLD AUTO: 11.9 FL (ref 9–12.9)
RBC # BLD AUTO: 4.34 M/UL (ref 4.2–5.4)
TIBC SERPL-MCNC: 297 UG/DL (ref 250–450)
TSH SERPL DL<=0.005 MIU/L-ACNC: 0.14 UIU/ML (ref 0.38–5.33)
UIBC SERPL-MCNC: 136 UG/DL (ref 110–370)
WBC # BLD AUTO: 6.9 K/UL (ref 4.8–10.8)

## 2022-11-01 PROCEDURE — 83540 ASSAY OF IRON: CPT

## 2022-11-01 PROCEDURE — 83550 IRON BINDING TEST: CPT

## 2022-11-01 PROCEDURE — 36415 COLL VENOUS BLD VENIPUNCTURE: CPT

## 2022-11-01 PROCEDURE — 85025 COMPLETE CBC W/AUTO DIFF WBC: CPT

## 2022-11-01 PROCEDURE — 82728 ASSAY OF FERRITIN: CPT

## 2022-11-01 PROCEDURE — 99395 PREV VISIT EST AGE 18-39: CPT | Performed by: PHYSICIAN ASSISTANT

## 2022-11-01 PROCEDURE — 84443 ASSAY THYROID STIM HORMONE: CPT

## 2022-11-01 RX ORDER — LEVOTHYROXINE SODIUM 112 UG/1
224 TABLET ORAL
Qty: 180 TABLET | Refills: 3 | Status: SHIPPED | OUTPATIENT
Start: 2022-11-01 | End: 2022-11-03

## 2022-11-01 RX ORDER — LEVOTHYROXINE SODIUM 112 UG/1
TABLET ORAL
COMMUNITY
Start: 2022-10-27 | End: 2022-11-01 | Stop reason: SDUPTHER

## 2022-11-01 RX ORDER — LEVOTHYROXINE SODIUM 0.12 MG/1
250 TABLET ORAL
COMMUNITY
Start: 2022-09-08 | End: 2022-11-01

## 2022-11-01 ASSESSMENT — FIBROSIS 4 INDEX: FIB4 SCORE: 0.54

## 2022-11-01 NOTE — PROGRESS NOTES
Subjective:     CC:   Chief Complaint   Patient presents with    Annual Wellness Visit       HPI:   Isabel Curry is a 37 y.o. female who presents for annual exam    Patient has GYN provider: Yes  Last Pap Smear:    H/O Abnormal Pap: Yes,  leep normal  Last Mammogram: -high risk for breast cancer.  Overdue for breast mammogram.  Last Tdap:   Received HPV series: Patient does not recall    Exercise: Walks 5 miles at work, but no specific exercise routine  Diet: good      No LMP recorded. Patient has had an implant.  Hx STDs: No  Birth control: IUD  Menses irregular days with intermittent spotting.  Denies cramping.  No significant bloating/fluid retention, pelvic pain, or dyspareunia. No abnormal vaginal discharge.  No breast tenderness, mass, nipple discharge, changes in size or contour, or abnormal cyclic discomfort.    OB History    Para Term  AB Living   3 2 2 0 1 0   SAB IAB Ectopic Molar Multiple Live Births   1 0 0 0 0 0      She  reports being sexually active and has had partner(s) who are male. She reports using the following method of birth control/protection: I.U.D..    She  has a past medical history of ADD (attention deficit disorder), ADHD, BRCA negative (10/15/2019), Family history of breast cancer in mother (9/10/2019), H/O gastric bypass, History of anemia, and Hypothyroidism.  She  has a past surgical history that includes gastric bypass laparoscopic (); primary c section (2011); primary c section (8/3/2015); abdominoplasty (3/21/2016); and mammoplasty augmentation (Bilateral, 2017).    Family History   Problem Relation Age of Onset    Hypertension Mother     Hyperlipidemia Mother     Cancer Mother         breast    Psychiatric Illness Mother         Depression    Diabetes Father     Diabetes Paternal Aunt     Cancer Maternal Grandfather         leukemia    Breast Cancer Paternal Grandmother     Heart Disease Neg Hx     Stroke Neg Hx      Social  History     Tobacco Use    Smoking status: Light Smoker     Packs/day: 0.10     Years: 6.00     Pack years: 0.60     Types: Cigarettes     Last attempt to quit: 2014     Years since quittin.9    Smokeless tobacco: Never    Tobacco comments:     1 cig per day   Vaping Use    Vaping Use: Never used   Substance Use Topics    Alcohol use: No     Alcohol/week: 0.0 oz     Comment: 1-2 per month    Drug use: No       Patient Active Problem List    Diagnosis Date Noted    Iron deficiency anemia secondary to inadequate dietary iron intake 2019    Vitamin B 12 deficiency 2019    Primary insomnia 2019    Attention deficit hyperactivity disorder (ADHD), combined type- dr bethanie gray, psychiatrist 2018    Vitamin D deficiency 2018    S/P gastric bypass 2018    Congenital hypothyroidism without goiter 2015     Current Outpatient Medications   Medication Sig Dispense Refill    levothyroxine (SYNTHROID) 112 MCG Tab Take 2 Tablets by mouth every morning on an empty stomach. 180 Tablet 3     No current facility-administered medications for this visit.     No Known Allergies    Review of Systems   Constitutional: Negative for fever, chills and malaise/fatigue.   HENT: Negative for congestion.    Eyes: Negative for pain.   Respiratory: Negative for cough and shortness of breath.    Cardiovascular: Negative for chest pain and leg swelling.   Gastrointestinal: Negative for nausea, vomiting, abdominal pain and diarrhea.   Genitourinary: Negative for dysuria and hematuria.   Skin: Negative for rash.   Neurological: Negative for dizziness, focal weakness and headaches.   Endo/Heme/Allergies: Does not bruise/bleed easily.   Psychiatric/Behavioral: Negative for depression.  The patient is not nervous/anxious.      Objective:   /66 (BP Location: Right arm, Patient Position: Sitting, BP Cuff Size: Adult long)   Pulse 83   Temp (!) 35.8 °C (96.4 °F) (Temporal)   Resp 16   Ht 1.575 m  "(5' 2\")   Wt 69.3 kg (152 lb 12.8 oz)   SpO2 96%   BMI 27.95 kg/m²     Wt Readings from Last 4 Encounters:   11/01/22 69.3 kg (152 lb 12.8 oz)   07/26/22 71.2 kg (157 lb)   07/12/22 71.7 kg (158 lb)   04/07/22 71.2 kg (157 lb)         Physical Exam:  Constitutional: Well-developed and well-nourished. Not diaphoretic. No distress.   Skin: Skin is warm and dry. No rash noted.  Head: Atraumatic without lesions.  Eyes: Conjunctivae and extraocular motions are normal. Pupils are equal, round, and reactive to light. No scleral icterus.   Ears:  External ears unremarkable. Tympanic membranes clear and intact.  Nose: Nares patent. Septum midline. Turbinates without erythema nor edema. No discharge.   Mouth/Throat: Tongue normal. Oropharynx is clear and moist. Posterior pharynx without erythema or exudates.  Neck: Supple, trachea midline. Normal range of motion. No thyromegaly present. No lymphadenopathy--cervical or supraclavicular.  Cardiovascular: Regular rate and rhythm, S1 and S2 without murmur, rubs, or gallops.    Respiratory: Effort normal. Clear to auscultation throughout. No adventitious sounds.   Abdomen: Soft, non tender, and without distention. Active bowel sounds in all four quadrants. No rebound, guarding, masses or HSM.  Extremities: No cyanosis, clubbing, erythema, nor edema. Distal pulses intact and symmetric.   Musculoskeletal: All major joints AROM full in all directions without pain.  Neurological: Alert and oriented x 3. Grossly non-focal. Strength and sensation grossly intact. DTRs 2+/3 and symmetric.   Psychiatric:  Behavior, mood, and affect are appropriate.    Assessment and Plan:     1. Well adult exam  Advised increase physical activity as tolerated and reviewed diet control.  Advised to call GYN to schedule her Pap    2. Congenital hypothyroidism without goiter  -Medications were adjusted about 6 months ago, she has noted a good improvement in her overall symptoms.  Does not feel nearly as " jittery or anxious.  Overdue for thyroid lab.  Advised to have this completed.  - levothyroxine (SYNTHROID) 112 MCG Tab; Take 2 Tablets by mouth every morning on an empty stomach.  Dispense: 180 Tablet; Refill: 3    3. Iron deficiency anemia secondary to inadequate dietary iron intake  -Last CBC within normal limits.  Advised to have labs completed we will continue monitor.    4. At high risk for breast cancer  -High risk for breast cancer.  MRI ordered  - MR-BREAST-BILATERAL-W/O; Future    Health maintenance:     Labs per orders  Immunizations per orders  Patient counseled about skin care, diet, supplements, and exercise.  Discussed  breast self exam, mammography screening, diet and exercise     Follow-up: Return in about 1 year (around 11/1/2023) for Annual PX.

## 2022-11-03 DIAGNOSIS — E03.1 CONGENITAL HYPOTHYROIDISM WITHOUT GOITER: ICD-10-CM

## 2022-11-03 RX ORDER — LEVOTHYROXINE SODIUM 0.2 MG/1
200 TABLET ORAL
Qty: 90 TABLET | Refills: 1 | Status: SHIPPED | OUTPATIENT
Start: 2022-11-03 | End: 2023-05-18

## 2022-11-17 ENCOUNTER — OFFICE VISIT (OUTPATIENT)
Dept: MEDICAL GROUP | Age: 37
End: 2022-11-17
Payer: COMMERCIAL

## 2022-11-17 VITALS
OXYGEN SATURATION: 97 % | WEIGHT: 153.4 LBS | HEIGHT: 62 IN | DIASTOLIC BLOOD PRESSURE: 72 MMHG | HEART RATE: 73 BPM | SYSTOLIC BLOOD PRESSURE: 118 MMHG | TEMPERATURE: 98.4 F | RESPIRATION RATE: 16 BRPM | BODY MASS INDEX: 28.23 KG/M2

## 2022-11-17 DIAGNOSIS — N93.9 ABNORMAL UTERINE BLEEDING: ICD-10-CM

## 2022-11-17 PROCEDURE — 99214 OFFICE O/P EST MOD 30 MIN: CPT | Performed by: FAMILY MEDICINE

## 2022-11-17 ASSESSMENT — FIBROSIS 4 INDEX: FIB4 SCORE: 0.57

## 2022-11-17 NOTE — PROGRESS NOTES
"This medical record contains text that has been entered with the assistance of computer voice recognition and dictation software.  Therefore, it may contain unintended errors in text, spelling, punctuation, or grammar      Chief Complaint   Patient presents with    Menstrual Problem     Irregular, brown spotting, noticing \"stuff\" while urinating, has mirana ring for a while, no issues previously,         Isabel Curry is a 37 y.o. female here evaluation and management of: Abnormal menstruation      HPI:           1. Abnormal uterine bleeding  NEW UNDIAGNOSED PROBLEM    Isabel is a very pleasant 37-year-old female who presents to clinic with a chief complaint of having abnormal uterine cycles.  She states lately they have been lighter and she noticed some tissue coming out as well.  She does have an IUD in so she cannot be pregnant but she wants to make sure.  She denies any fevers chills night sweats no nausea or vomiting she is able to tolerate p.o. fluids.    Current medicines (including changes today)  Current Outpatient Medications   Medication Sig Dispense Refill    levothyroxine (SYNTHROID) 200 MCG Tab Take 1 Tablet by mouth every morning on an empty stomach. 90 Tablet 1     No current facility-administered medications for this visit.     She  has a past medical history of ADD (attention deficit disorder), ADHD, BRCA negative (10/15/2019), Family history of breast cancer in mother (9/10/2019), H/O gastric bypass, History of anemia, and Hypothyroidism.  She  has a past surgical history that includes gastric bypass laparoscopic (); primary c section (2011); primary c section (8/3/2015); abdominoplasty (3/21/2016); and mammoplasty augmentation (Bilateral, 2017).  Social History     Tobacco Use    Smoking status: Light Smoker     Packs/day: 0.10     Years: 6.00     Pack years: 0.60     Types: Cigarettes     Last attempt to quit: 2014     Years since quittin.9    Smokeless tobacco: " "Never    Tobacco comments:     1 cig per day   Vaping Use    Vaping Use: Never used   Substance Use Topics    Alcohol use: No     Alcohol/week: 0.0 oz     Comment: 1-2 per month    Drug use: No     Social History     Social History Narrative    Lives with  and 2 sons.     Work: stay at home mom     Family History   Problem Relation Age of Onset    Hypertension Mother     Hyperlipidemia Mother     Cancer Mother         breast    Psychiatric Illness Mother         Depression    Diabetes Father     Diabetes Paternal Aunt     Cancer Maternal Grandfather         leukemia    Breast Cancer Paternal Grandmother     Heart Disease Neg Hx     Stroke Neg Hx      Family Status   Relation Name Status    Mo  Alive    Fa  Alive    Sis  (Not Specified)    PAunt  (Not Specified)    MGMo  (Not Specified)    MGFa  (Not Specified)    PGMo  (Not Specified)    Son  Alive    Son  Alive    Neg Hx  (Not Specified)         ROS    The pertinent  ROS findings can be seen in the HPI above.     All other systems reviewed and are negative     Objective:     /72 (BP Location: Right arm, Patient Position: Sitting, BP Cuff Size: Adult long)   Pulse 73   Temp 36.9 °C (98.4 °F) (Temporal)   Resp 16   Ht 1.575 m (5' 2\")   Wt 69.6 kg (153 lb 6.4 oz)   SpO2 97%  Body mass index is 28.06 kg/m².      Physical Exam:    Constitutional: Alert, no distress.  Skin: No suspicious lesions  Eye: Equal, round and reactive, conjunctiva clear, lids normal.  ENMT: Lips without lesions, good dentition, oropharynx clear.  Neck: Trachea midline, no masses, no thyromegaly. No cervical or supraclavicular lymphadenopathy.  Respiratory: Unlabored respiratory effort, lungs clear to auscultation, no wheezes, no ronchi.  Cardiovascular: Normal S1, S2, no murmur, no edema  Abdomen: Soft, non-tender, no masses, no hepatosplenomegaly.  Pelvic exam-deferred      Assessment and Plan:   The following treatment plan was discussed      1. Abnormal uterine " bleeding    We will obtain a transvaginal ultrasound to evaluate for uterine fibroids    - US-PELVIC TRANSVAGINAL ONLY; Future  - HCG QUAL SERUM; Future             Instructed to Follow up in clinic or ER for worsening symptoms, difficulty breathing, lack of expected recovery, or should new symptoms or problems arise.    Followup: Return in about 3 months (around 2/17/2023) for Reevaluation, labs.

## 2022-12-21 ENCOUNTER — APPOINTMENT (OUTPATIENT)
Dept: RADIOLOGY | Facility: MEDICAL CENTER | Age: 37
End: 2022-12-21
Attending: FAMILY MEDICINE
Payer: COMMERCIAL

## 2023-05-18 DIAGNOSIS — E03.1 CONGENITAL HYPOTHYROIDISM WITHOUT GOITER: ICD-10-CM

## 2023-05-18 RX ORDER — LEVOTHYROXINE SODIUM 0.2 MG/1
TABLET ORAL
Qty: 90 TABLET | Refills: 1 | Status: SHIPPED | OUTPATIENT
Start: 2023-05-18 | End: 2023-08-23 | Stop reason: SDUPTHER

## (undated) DEVICE — SOD. CHL. INJ. 0.9% 1000 ML - (14EA/CA 60CA/PF)

## (undated) DEVICE — BLANKET UNDERBODY FULL ACCES - (5/CA)

## (undated) DEVICE — GOWN WARMING STANDARD FLEX - (30/CA)

## (undated) DEVICE — ADHESIVE MASTISOL - (48/BX)

## (undated) DEVICE — SUTURE 4-0 30CM X 30CM STRATAFIX SPRIAL PGA/PCL CLR FS-2 NEEDLE (12/BX)

## (undated) DEVICE — MASK ANESTHESIA ADULT  - (100/CA)

## (undated) DEVICE — SUTURE GENERAL

## (undated) DEVICE — KIT ANESTHESIA W/CIRCUIT & 3/LT BAG W/FILTER (20EA/CA)

## (undated) DEVICE — SENSOR SPO2 NEO LNCS ADHESIVE (20/BX) SEE USER NOTES

## (undated) DEVICE — BANDAGE ELASTIC DOUBLE 6X10 - (6EA/BX)"

## (undated) DEVICE — SODIUM CHL IRRIGATION 0.9% 1000ML (12EA/CA)

## (undated) DEVICE — GLOVE, LITE (PAIR)

## (undated) DEVICE — GOWN SURGEONS X-LARGE - DISP. (30/CA)

## (undated) DEVICE — SUCTION INSTRUMENT YANKAUER BULBOUS TIP W/O VENT (50EA/CA)

## (undated) DEVICE — ARMREST CRADLE FOAM - (2PR/PK 12PR/CA)

## (undated) DEVICE — PACK BREAST SM OR - (1EA/CA)

## (undated) DEVICE — PROTECTOR ULNA NERVE - (36PR/CA)

## (undated) DEVICE — KIT ROOM DECONTAMINATION

## (undated) DEVICE — HUMID-VENT HEAT AND MOISTURE EXCHANGE- (50/BX)

## (undated) DEVICE — WATER IRRIGATION STERILE 1000ML (12EA/CA)

## (undated) DEVICE — BOVIE BLADE COATED &INSULATED (50EA/PK)

## (undated) DEVICE — TUBE CONNECTING SUCTION - CLEAR PLASTIC STERILE 72 IN (50EA/CA)

## (undated) DEVICE — LACTATED RINGERS INJ 1000 ML - (14EA/CA 60CA/PF)

## (undated) DEVICE — Device

## (undated) DEVICE — GLOVE BIOGEL SZ 7 SURGICAL PF LTX - (50PR/BX 4BX/CA)

## (undated) DEVICE — ELECTRODE 850 FOAM ADHESIVE - HYDROGEL RADIOTRNSPRNT (50/PK)

## (undated) DEVICE — SUTURE 3-0 VICRYL PLUSPS-2 - 18 INCH (36/BX)

## (undated) DEVICE — TOWELS CLOTH SURGICAL - (4/PK 20PK/CA)

## (undated) DEVICE — SPONGE GAUZESTER 4 X 4 4PLY - (128PK/CA)

## (undated) DEVICE — CANISTER SUCTION RIGID RED 1500CC (40EA/CA)

## (undated) DEVICE — ELECTRODE DUAL RETURN W/ CORD - (50/PK)

## (undated) DEVICE — HEAD HOLDER JUNIOR/ADULT

## (undated) DEVICE — GOWN SURGICAL XX-LARGE - (28EA/CA) SIRUS NON REINFORCED

## (undated) DEVICE — GLOVE BIOGEL SZ 8.5 SURGICAL PF LTX - (50PR/BX 4BX/CA)

## (undated) DEVICE — TRAY SKIN SCRUB PVP WET (20EA/CA) PART #DYND70356 DISCONTINUED

## (undated) DEVICE — BAG, SPONGE COUNT 50600

## (undated) DEVICE — MASK, LARYNGEAL AIRWAY #4